# Patient Record
Sex: FEMALE | Race: WHITE | Employment: OTHER | URBAN - METROPOLITAN AREA
[De-identification: names, ages, dates, MRNs, and addresses within clinical notes are randomized per-mention and may not be internally consistent; named-entity substitution may affect disease eponyms.]

---

## 2019-12-16 ENCOUNTER — HOSPITAL ENCOUNTER (EMERGENCY)
Age: 84
Discharge: HOME | End: 2019-12-16
Attending: EMERGENCY MEDICINE
Payer: MEDICARE

## 2019-12-16 ENCOUNTER — APPOINTMENT (OUTPATIENT)
Dept: CT IMAGING | Age: 84
End: 2019-12-16
Attending: EMERGENCY MEDICINE
Payer: MEDICARE

## 2019-12-16 VITALS
SYSTOLIC BLOOD PRESSURE: 152 MMHG | TEMPERATURE: 97.8 F | DIASTOLIC BLOOD PRESSURE: 84 MMHG | RESPIRATION RATE: 18 BRPM | WEIGHT: 100.97 LBS | OXYGEN SATURATION: 99 % | HEART RATE: 90 BPM

## 2019-12-16 DIAGNOSIS — M54.9 BACK PAIN, UNSPECIFIED BACK LOCATION, UNSPECIFIED BACK PAIN LATERALITY, UNSPECIFIED CHRONICITY: ICD-10-CM

## 2019-12-16 DIAGNOSIS — S01.01XA LACERATION OF SCALP, INITIAL ENCOUNTER: ICD-10-CM

## 2019-12-16 DIAGNOSIS — S09.90XA CLOSED HEAD INJURY, INITIAL ENCOUNTER: Primary | ICD-10-CM

## 2019-12-16 DIAGNOSIS — R26.81 UNSTEADY GAIT: ICD-10-CM

## 2019-12-16 LAB
ALBUMIN SERPL-MCNC: 3.6 G/DL (ref 3.5–5)
ALBUMIN/GLOB SERPL: 1 {RATIO} (ref 1.1–2.2)
ALP SERPL-CCNC: 75 U/L (ref 45–117)
ALT SERPL-CCNC: 21 U/L (ref 12–78)
ANION GAP SERPL CALC-SCNC: 5 MMOL/L (ref 5–15)
APPEARANCE UR: CLEAR
AST SERPL-CCNC: 27 U/L (ref 15–37)
ATRIAL RATE: 84 BPM
BACTERIA URNS QL MICRO: NEGATIVE /HPF
BASOPHILS # BLD: 0 K/UL (ref 0–0.1)
BASOPHILS NFR BLD: 1 % (ref 0–1)
BILIRUB SERPL-MCNC: 0.4 MG/DL (ref 0.2–1)
BILIRUB UR QL: NEGATIVE
BUN SERPL-MCNC: 21 MG/DL (ref 6–20)
BUN/CREAT SERPL: 25 (ref 12–20)
CALCIUM SERPL-MCNC: 9.3 MG/DL (ref 8.5–10.1)
CALCULATED P AXIS, ECG09: 81 DEGREES
CALCULATED R AXIS, ECG10: -39 DEGREES
CALCULATED T AXIS, ECG11: 82 DEGREES
CHLORIDE SERPL-SCNC: 108 MMOL/L (ref 97–108)
CK SERPL-CCNC: 69 U/L (ref 26–192)
CO2 SERPL-SCNC: 27 MMOL/L (ref 21–32)
COLOR UR: NORMAL
CREAT SERPL-MCNC: 0.83 MG/DL (ref 0.55–1.02)
DIAGNOSIS, 93000: NORMAL
DIFFERENTIAL METHOD BLD: ABNORMAL
EOSINOPHIL # BLD: 0.3 K/UL (ref 0–0.4)
EOSINOPHIL NFR BLD: 4 % (ref 0–7)
EPITH CASTS URNS QL MICRO: NORMAL /LPF
ERYTHROCYTE [DISTWIDTH] IN BLOOD BY AUTOMATED COUNT: 11.9 % (ref 11.5–14.5)
GLOBULIN SER CALC-MCNC: 3.5 G/DL (ref 2–4)
GLUCOSE SERPL-MCNC: 125 MG/DL (ref 65–100)
GLUCOSE UR STRIP.AUTO-MCNC: NEGATIVE MG/DL
HCT VFR BLD AUTO: 36.4 % (ref 35–47)
HGB BLD-MCNC: 11.7 G/DL (ref 11.5–16)
HGB UR QL STRIP: NEGATIVE
HYALINE CASTS URNS QL MICRO: NORMAL /LPF (ref 0–5)
IMM GRANULOCYTES # BLD AUTO: 0 K/UL (ref 0–0.04)
IMM GRANULOCYTES NFR BLD AUTO: 0 % (ref 0–0.5)
KETONES UR QL STRIP.AUTO: NEGATIVE MG/DL
LEUKOCYTE ESTERASE UR QL STRIP.AUTO: NEGATIVE
LYMPHOCYTES # BLD: 2.8 K/UL (ref 0.8–3.5)
LYMPHOCYTES NFR BLD: 34 % (ref 12–49)
MCH RBC QN AUTO: 31.9 PG (ref 26–34)
MCHC RBC AUTO-ENTMCNC: 32.1 G/DL (ref 30–36.5)
MCV RBC AUTO: 99.2 FL (ref 80–99)
MONOCYTES # BLD: 0.4 K/UL (ref 0–1)
MONOCYTES NFR BLD: 5 % (ref 5–13)
NEUTS SEG # BLD: 4.8 K/UL (ref 1.8–8)
NEUTS SEG NFR BLD: 56 % (ref 32–75)
NITRITE UR QL STRIP.AUTO: NEGATIVE
NRBC # BLD: 0 K/UL (ref 0–0.01)
NRBC BLD-RTO: 0 PER 100 WBC
P-R INTERVAL, ECG05: 180 MS
PH UR STRIP: 6 [PH] (ref 5–8)
PLATELET # BLD AUTO: 166 K/UL (ref 150–400)
PMV BLD AUTO: 10.8 FL (ref 8.9–12.9)
POTASSIUM SERPL-SCNC: 3.5 MMOL/L (ref 3.5–5.1)
PROT SERPL-MCNC: 7.1 G/DL (ref 6.4–8.2)
PROT UR STRIP-MCNC: NEGATIVE MG/DL
Q-T INTERVAL, ECG07: 360 MS
QRS DURATION, ECG06: 84 MS
QTC CALCULATION (BEZET), ECG08: 425 MS
RBC # BLD AUTO: 3.67 M/UL (ref 3.8–5.2)
RBC #/AREA URNS HPF: NORMAL /HPF (ref 0–5)
SODIUM SERPL-SCNC: 140 MMOL/L (ref 136–145)
SP GR UR REFRACTOMETRY: 1.01 (ref 1–1.03)
TROPONIN I SERPL-MCNC: <0.05 NG/ML
UA: UC IF INDICATED,UAUC: NORMAL
UROBILINOGEN UR QL STRIP.AUTO: 0.2 EU/DL (ref 0.2–1)
VENTRICULAR RATE, ECG03: 84 BPM
WBC # BLD AUTO: 8.3 K/UL (ref 3.6–11)
WBC URNS QL MICRO: NORMAL /HPF (ref 0–4)

## 2019-12-16 PROCEDURE — 82550 ASSAY OF CK (CPK): CPT

## 2019-12-16 PROCEDURE — 75810000293 HC SIMP/SUPERF WND  RPR

## 2019-12-16 PROCEDURE — 97530 THERAPEUTIC ACTIVITIES: CPT

## 2019-12-16 PROCEDURE — 97116 GAIT TRAINING THERAPY: CPT

## 2019-12-16 PROCEDURE — 77030018836 HC SOL IRR NACL ICUM -A

## 2019-12-16 PROCEDURE — 77030008462 HC STPLR SKN PROX J&J -A

## 2019-12-16 PROCEDURE — 84484 ASSAY OF TROPONIN QUANT: CPT

## 2019-12-16 PROCEDURE — 36415 COLL VENOUS BLD VENIPUNCTURE: CPT

## 2019-12-16 PROCEDURE — 72131 CT LUMBAR SPINE W/O DYE: CPT

## 2019-12-16 PROCEDURE — 74011250636 HC RX REV CODE- 250/636: Performed by: EMERGENCY MEDICINE

## 2019-12-16 PROCEDURE — 97162 PT EVAL MOD COMPLEX 30 MIN: CPT

## 2019-12-16 PROCEDURE — 70450 CT HEAD/BRAIN W/O DYE: CPT

## 2019-12-16 PROCEDURE — 97161 PT EVAL LOW COMPLEX 20 MIN: CPT

## 2019-12-16 PROCEDURE — 81001 URINALYSIS AUTO W/SCOPE: CPT

## 2019-12-16 PROCEDURE — 93005 ELECTROCARDIOGRAM TRACING: CPT

## 2019-12-16 PROCEDURE — 74011250637 HC RX REV CODE- 250/637: Performed by: EMERGENCY MEDICINE

## 2019-12-16 PROCEDURE — 72128 CT CHEST SPINE W/O DYE: CPT

## 2019-12-16 PROCEDURE — 99285 EMERGENCY DEPT VISIT HI MDM: CPT

## 2019-12-16 PROCEDURE — 80053 COMPREHEN METABOLIC PANEL: CPT

## 2019-12-16 PROCEDURE — 72125 CT NECK SPINE W/O DYE: CPT

## 2019-12-16 PROCEDURE — 85025 COMPLETE CBC W/AUTO DIFF WBC: CPT

## 2019-12-16 RX ORDER — MECLIZINE HCL 12.5 MG 12.5 MG/1
25 TABLET ORAL
Status: COMPLETED | OUTPATIENT
Start: 2019-12-16 | End: 2019-12-16

## 2019-12-16 RX ORDER — BISMUTH SUBSALICYLATE 262 MG
1 TABLET,CHEWABLE ORAL DAILY
COMMUNITY

## 2019-12-16 RX ORDER — METHOCARBAMOL 500 MG/1
500 TABLET, FILM COATED ORAL
Status: COMPLETED | OUTPATIENT
Start: 2019-12-16 | End: 2019-12-16

## 2019-12-16 RX ORDER — ACETAMINOPHEN 500 MG
1000 TABLET ORAL
Status: COMPLETED | OUTPATIENT
Start: 2019-12-16 | End: 2019-12-16

## 2019-12-16 RX ORDER — MECLIZINE HYDROCHLORIDE 25 MG/1
25 TABLET ORAL
Qty: 20 TAB | Refills: 0 | Status: SHIPPED | OUTPATIENT
Start: 2019-12-16 | End: 2020-12-03

## 2019-12-16 RX ORDER — ACETAMINOPHEN 500 MG/1
1000 CAPSULE, LIQUID FILLED ORAL DAILY
COMMUNITY
End: 2020-12-03

## 2019-12-16 RX ORDER — METHOCARBAMOL 500 MG/1
500 TABLET, FILM COATED ORAL
Qty: 20 TAB | Refills: 0 | Status: SHIPPED | OUTPATIENT
Start: 2019-12-16 | End: 2020-11-27

## 2019-12-16 RX ORDER — ACETAMINOPHEN 500 MG/1
500 CAPSULE, LIQUID FILLED ORAL
COMMUNITY
End: 2020-12-03

## 2019-12-16 RX ORDER — MULTIVITAMIN
1 TABLET ORAL DAILY
COMMUNITY

## 2019-12-16 RX ORDER — KETOROLAC TROMETHAMINE 30 MG/ML
15 INJECTION, SOLUTION INTRAMUSCULAR; INTRAVENOUS
Status: COMPLETED | OUTPATIENT
Start: 2019-12-16 | End: 2019-12-16

## 2019-12-16 RX ADMIN — KETOROLAC TROMETHAMINE 15 MG: 30 INJECTION, SOLUTION INTRAMUSCULAR at 10:57

## 2019-12-16 RX ADMIN — MECLIZINE 25 MG: 12.5 TABLET ORAL at 13:00

## 2019-12-16 RX ADMIN — ACETAMINOPHEN 1000 MG: 500 TABLET ORAL at 09:43

## 2019-12-16 RX ADMIN — METHOCARBAMOL TABLETS 500 MG: 500 TABLET, COATED ORAL at 10:57

## 2019-12-16 NOTE — PROGRESS NOTES
Problem: Mobility Impaired (Adult and Pediatric)  Goal: *Acute Goals and Plan of Care (Insert Text)  Description  FUNCTIONAL STATUS PRIOR TO ADMISSION: Pt is very [place] and indicates son can answer hx questions. He states that pt uses a quad cane mostly on her own at baseline but states she has had multiple falls since Sept. He states she does her own bathing and dressing. He is not sure how she accomplishes bathing but believes she sits on the toilet with legs in tub and uses hand held. They both state she has had existing back pain issues for which she states \"I take my tylenol and caltrate in the morning and then I'm ok\". Son states she own a [facility] but does not use it. The son manages her meds but per him she takes very few other than vitamins. HOME SUPPORT PRIOR TO ADMISSION: The patient lived with son but required little assist.    Physical Therapy Goals  Initiated 12/16/2019  1. Patient will move from supine to sit and sit to supine , scoot up and down and roll side to side in bed with minimal assistance/contact guard assist within 7 day(s). 2.  Patient will transfer from bed to chair and chair to bed with minimal assistance/contact guard assist using the least restrictive device within 7 day(s). MET  3. Patient will perform sit to stand with minimal assistance/contact guard assist within 7 day(s). MET  4. Patient will ambulate with minimal assistance/contact guard assist for 150 feet with the least restrictive device within 7 day(s). MET  5. Patient will ascend/descend 3 stairs with handrail(s) with minimal assistance/contact guard assist within 7 day(s). 12/16/2019 1222 by Rosalind Morel, PT  Outcome: Resolved/Met, see above  12/16/2019 1022 by Robby Garcia, PT  Outcome: Progressing Towards Goal    PHYSICAL THERAPY TREATMENT  Patient: Mine Obando (51 y.o. female)  Date: 12/16/2019  Diagnosis: No admission diagnoses are documented for this encounter.    <principal problem not specified>       Precautions: fall   Chart, physical therapy assessment, plan of care and goals were reviewed. ASSESSMENT  Patient continues with skilled PT services and is progressing towards goals. Pt has been medicated further in the ED. Updated testing has come back negative for acute changes. Second ED PT session requested by Dr. Aden Villalpando to re-assess pt's progress with mobility. Pt now showing much improved function, meeting above goals for functional mobility and amb on level. Current Level of Function Impacting Discharge (mobility/balance): Pt is now able to complete all bed mobility, transfers and amb with RW with min to CGA of 1. She is no longer c/o dizziness and reports pain much more tolerable. Recommended continued use of RW and follow up with HHPT given fall hx and current issues related to last night's fall. Pt refusing both. Reviewed benefits and rationale of both the device and the therapy with the son and the pt. Pt continues to state she wants to just go home and see how she does and wants to use her quad cane. Son compliant with pt's wishes. Emphasized safety at home with use of BSC to avoid rushing for toileting. Emphasized need for son to physically be present and assist with mobility and ADLs and both voiced understanding. Other factors to consider for discharge: son lives with her but typically does not provide close support         PLAN :  Patient continues to benefit from skilled intervention to address the above impairments. Continue treatment per established plan of care. to address goals.     Recommendation for discharge: (in order for the patient to meet his/her long term goals)  Physical therapy at least 2 days/week in the home AND ensure assist and/or supervision for safety with mobility/gait; pt and son decline HHPT     This discharge recommendation:  Has been made in collaboration with the attending provider and/or case management    IF patient discharges home will need the following DME: rolling walker but pt declined       SUBJECTIVE:   Patient stated I want to go home and see how I do.     OBJECTIVE DATA SUMMARY:   Critical Behavior:  Neurologic State: Alert, Other (Comment)(in distress from pain and dizziness)  Orientation Level: Oriented to person, Oriented to place, Oriented to situation  Cognition: Follows commands     Functional Mobility Training:  Bed Mobility:  Rolling: Minimum assistance  Supine to Sit: Contact guard assistance  Sit to Supine: Minimum assistance  Scooting: Contact guard assistance        Transfers:  Sit to Stand: Minimum assistance;Contact guard assistance  Stand to Sit: Contact guard assistance                             Balance:  Sitting: Intact  Sitting - Static: Fair (occasional)  Sitting - Dynamic: Poor (constant support)(due to tolerance)  Standing: Impaired  Standing - Static: Good; Other (comment)(with RW)  Standing - Dynamic : Fair(with RW)  Ambulation/Gait Training:  Distance (ft): 20 Feet (ft)  Assistive Device: Gait belt;Walker, rolling  Ambulation - Level of Assistance: Minimal assistance;Contact guard assistance;Assist x1     Gait Description (WDL): (unable to take steps)  Gait Abnormalities: Antalgic(on RLE)        Base of Support: Shift to left  Stance: Right decreased  Speed/Honey: Pace decreased (<100 feet/min); Slow            Activity Tolerance:   Fair  Please refer to the flowsheet for vital signs taken during this treatment.     After treatment patient left in no apparent distress:   Supine in bed, Call bell within reach, Caregiver / family present, and stretcher rails up     COMMUNICATION/COLLABORATION:   The patients plan of care was discussed with: Registered Nurse and care manager     Claudis Klinefelter Doornik, PT   Time Calculation: 29 mins

## 2019-12-16 NOTE — ED NOTES
Physical therapy at bedside; pt now has c/o back pain. Told Dr. Shaggy Maguire who verbally ordered Tylenol.

## 2019-12-16 NOTE — ED TRIAGE NOTES
Assumed care of pt from EMS. Per EMS pt reports that she tripped and fell back into the tub. PT hit back of head and is bleeding. Bandage applied by EMS. Pt son denies her being on blood thinners. Pt repeating that she is cold. C collar applied on arrival. Pt has hx of seizures and hip shoulder and elbow fx. Pt on monitor x3. Son at bedside.

## 2019-12-16 NOTE — ED NOTES
Attempted to walk pt. Pt reported feeling dizzy when sitting. Pt unstable on her feet and had to hold on to the RN while getting up.

## 2019-12-16 NOTE — ED NOTES
Pt resting quietly with son at bedside. Pt A&Ox4 and maintaining her own airway. Call bell in reach. Will continue to monitor.

## 2019-12-16 NOTE — ED NOTES
Bedside and Verbal shift change report given to Yvette Curiel (oncoming nurse) by Greta Boo (offgoing nurse). Report included the following information SBAR, ED Summary, MAR and Recent Results.

## 2019-12-16 NOTE — DISCHARGE INSTRUCTIONS
Patient Education        Learning About a Closed Head Injury  What is a closed head injury? A closed head injury happens when your head gets hit hard. The strong force of the blow causes your brain to shake in your skull. This movement can cause the brain to bruise, swell, or tear. Sometimes nerves or blood vessels also get damaged. This can cause bleeding in or around the brain. A concussion is a type of closed head injury. What are the symptoms? If you have a mild concussion, you may have a mild headache or feel \"not quite right. \" These symptoms are common. They usually go away over a few days to 4 weeks. But sometimes after a concussion, you feel like you can't function as well as before the injury. And you have new symptoms. This is called postconcussive syndrome. You may:  · Find it harder to solve problems, think, concentrate, or remember. · Have headaches. · Have changes in your sleep patterns, such as not being able to sleep or sleeping all the time. · Have changes in your personality. · Not be interested in your usual activities. · Feel angry or anxious without a clear reason. · Lose your sense of taste or smell. · Be dizzy, lightheaded, or unsteady. It may be hard to stand or walk. How is a closed head injury treated? Any person who may have a concussion needs to see a doctor. Some people have to stay in the hospital to be watched. Others can go home safely. If you go home, follow your doctor's instructions. He or she will tell you if you need someone to watch you closely for the next 24 hours or longer. Rest is the best treatment. Get plenty of sleep at night. And try to rest during the day. · Avoid activities that are physically or mentally demanding. These include housework, exercise, and schoolwork. And don't play video games, send text messages, or use the computer. You may need to change your school or work schedule to be able to avoid these activities.   · Ask your doctor when it's okay to drive, ride a bike, or operate machinery. · Take an over-the-counter pain medicine, such as acetaminophen (Tylenol), ibuprofen (Advil, Motrin), or naproxen (Aleve). Be safe with medicines. Read and follow all instructions on the label. · Check with your doctor before you use any other medicines for pain. · Do not drink alcohol or use illegal drugs. They can slow recovery. They can also increase your risk of getting a second head injury. Follow-up care is a key part of your treatment and safety. Be sure to make and go to all appointments, and call your doctor if you are having problems. It's also a good idea to know your test results and keep a list of the medicines you take. Where can you learn more? Go to http://edward-christian.info/. Enter E235 in the search box to learn more about \"Learning About a Closed Head Injury. \"  Current as of: March 28, 2019  Content Version: 12.2  © 0431-3527 [employer]. Care instructions adapted under license by [employer] (which disclaims liability or warranty for this information). If you have questions about a medical condition or this instruction, always ask your healthcare professional. [place] [address] any warranty or liability for your use of this information. Patient Education        Cuts: Care Instructions  Your Care Instructions  A cut can happen anywhere on your body. Stitches, staples, skin adhesives, or pieces of tape called Steri-Strips are sometimes used to keep the edges of a cut together and help it heal. Steri-Strips can be used by themselves or with stitches or staples. Sometimes cuts are left open. If the cut went deep and through the skin, the doctor may have closed the cut in two layers. A deeper layer of stitches brings the deep part of the cut together. These stitches will dissolve and don't need to be removed.  The upper layer closure, which could be stitches, staples, Steri-Strips, or adhesive, is what you see on the cut. A cut is often covered by a bandage. The doctor has checked you carefully, but problems can develop later. If you notice any problems or new symptoms, get medical treatment right away. Follow-up care is a key part of your treatment and safety. Be sure to make and go to all appointments, and call your doctor if you are having problems. It's also a good idea to know your test results and keep a list of the medicines you take. How can you care for yourself at home? If a cut is open or closed  · Prop up the sore area on a pillow anytime you sit or lie down during the next 3 days. Try to keep it above the level of your heart. This will help reduce swelling. · Keep the cut dry for the first 24 to 48 hours. After this, you can shower if your doctor okays it. Pat the cut dry. · Don't soak the cut, such as in a bathtub. Your doctor will tell you when it's safe to get the cut wet. · After the first 24 to 48 hours, clean the cut with soap and water 2 times a day unless your doctor gives you different instructions. ? Don't use hydrogen peroxide or alcohol, which can slow healing. ? You may cover the cut with a thin layer of petroleum jelly and a nonstick bandage. ? If the doctor put a bandage over the cut, put on a new bandage after cleaning the cut or if the bandage gets wet or dirty. · Avoid any activity that could cause your cut to reopen. · Be safe with medicines. Read and follow all instructions on the label. ? If the doctor gave you a prescription medicine for pain, take it as prescribed. ? If you are not taking a prescription pain medicine, ask your doctor if you can take an over-the-counter medicine. If the cut is closed with stitches, staples, or Steri-Strips  · Follow the above instructions for open or closed cuts. · Do not remove the stitches or staples on your own.  Your doctor will tell you when to come back to have the stitches or staples removed. · Leave Steri-Strips on until they fall off. If the cut is closed with a skin adhesive  · Follow the above instructions for open or closed cuts. · Leave the skin adhesive on your skin until it falls off on its own. This may take 5 to 10 days. · Do not scratch, rub, or pick at the adhesive. · Do not put the sticky part of a bandage directly on the adhesive. · Do not put any kind of ointment, cream, or lotion over the area. This can make the adhesive fall off too soon. Do not use hydrogen peroxide or alcohol, which can slow healing. When should you call for help? Call your doctor now or seek immediate medical care if:    · You have new pain, or your pain gets worse.     · The skin near the cut is cold or pale or changes color.     · You have tingling, weakness, or numbness near the cut.     · The cut starts to bleed, and blood soaks through the bandage. Oozing small amounts of blood is normal.     · You have trouble moving the area near the cut.     · You have symptoms of infection, such as:  ? Increased pain, swelling, warmth, or redness around the cut.  ? Red streaks leading from the cut.  ? Pus draining from the cut.  ? A fever.    Watch closely for changes in your health, and be sure to contact your doctor if:    · The cut reopens.     · You do not get better as expected. Where can you learn more? Go to http://edward-christian.info/. Enter M735 in the search box to learn more about \"Cuts: Care Instructions. \"  Current as of: June 26, 2019  Content Version: 12.2  © 6213-9730 [employer]. Care instructions adapted under license by [employer] (which disclaims liability or warranty for this information). If you have questions about a medical condition or this instruction, always ask your healthcare professional. [address] any warranty or liability for your use of this information.        Patient Education        Preventing Falls: Care Instructions  Your Care Instructions    Getting around your home safely can be a challenge if you have injuries or health problems that make it easy for you to fall. Loose rugs and furniture in walkways are among the dangers for many older people who have problems walking or who have poor eyesight. People who have conditions such as arthritis, osteoporosis, or dementia also have to be careful not to fall. You can make your home safer with a few simple measures. Follow-up care is a key part of your treatment and safety. Be sure to make and go to all appointments, and call your doctor if you are having problems. It's also a good idea to know your test results and keep a list of the medicines you take. How can you care for yourself at home? Taking care of yourself  · You may get dizzy if you do not drink enough water. To prevent dehydration, drink plenty of fluids, enough so that your urine is light yellow or clear like water. Choose water and other caffeine-free clear liquids. If you have kidney, heart, or liver disease and have to limit fluids, talk with your doctor before you increase the amount of fluids you drink. · Exercise regularly to improve your strength, muscle tone, and balance. Walk if you can. Swimming may be a good choice if you cannot walk easily. · Have your vision and hearing checked each year or any time you notice a change. If you have trouble seeing and hearing, you might not be able to avoid objects and could lose your balance. · Know the side effects of the medicines you take. Ask your doctor or pharmacist whether the medicines you take can affect your balance. Sleeping pills or sedatives can affect your balance. · Limit the amount of alcohol you drink. Alcohol can impair your balance and other senses. · Ask your doctor whether calluses or corns on your feet need to be removed. If you wear loose-fitting shoes because of calluses or corns, you can lose your balance and fall.   · Talk to your doctor if you have numbness in your feet. Preventing falls at home  · Remove raised doorway thresholds, throw rugs, and clutter. Repair loose carpet or raised areas in the floor. · Move furniture and electrical cords to keep them out of walking paths. · Use nonskid floor wax, and wipe up spills right away, especially on ceramic tile floors. · If you use a walker or cane, put rubber tips on it. If you use crutches, clean the bottoms of them regularly with an abrasive pad, such as steel wool. · Keep your house well lit, especially [place], and outside walkways. Use night-lights in areas such as hallways and bathrooms. Add extra light switches or use remote switches (such as switches that go on or off when you clap your hands) to make it easier to turn lights on if you have to get up during the night. · Install sturdy handrails on stairways. · Move items in your cabinets so that the things you use a lot are on the lower shelves (about waist level). · Keep a cordless phone and a flashlight with new batteries by your bed. If possible, put a phone in each of the main rooms of your house, or carry a cell phone in case you fall and cannot reach a phone. Or, you can wear a device around your neck or wrist. You push a button that sends a signal for help. · Wear low-heeled shoes that fit well and give your feet good support. Use footwear with nonskid soles. Check the heels and soles of your shoes for wear. Repair or replace worn heels or soles. · Do not wear socks without shoes on wood floors. · Walk on the grass when the sidewalks are slippery. If you live in an area that gets snow and ice in the winter, sprinkle salt on slippery steps and sidewalks. Preventing falls in the bath  · Install grab bars and nonskid mats inside and outside your shower or tub and near the toilet and sinks. · Use shower chairs and bath benches.   · Use a hand-held shower head that will allow you to sit while showering. · Get into a tub or shower by putting the weaker leg in first. Get out of a tub or shower with your strong side first.  · Repair loose toilet seats and consider installing a raised toilet seat to make getting on and off the toilet easier. · Keep your bathroom door unlocked while you are in the shower. Where can you learn more? Go to http://edward-christian.info/. Enter 0476 [id number] 69 [id number] in the search box to learn more about \"Preventing Falls: Care Instructions. \"  Current as of: November 7, 2018  Content Version: 12.2  © 7129-6466 [employer]. Care instructions adapted under license by [employer] (which disclaims liability or warranty for this information). If you have questions about a medical condition or this instruction, always ask your healthcare professional. [address] any warranty or liability for your use of this information.

## 2019-12-16 NOTE — PROGRESS NOTES
Reason for Admission:   Fall at home (patient not being admitted)                   RRAT Score:          5 low risk           Plan for utilizing home health:      Has not used home health in the past                    Current Advanced Directive/Advance Care Plan:   none                         Transition of Care Plan:                      1.  Patient brought to ER after fall at home, patient not currently being admitted  2. Patient to discharge home with walker and home health services. Patient is a 80year old female brought to the ER after a fall at home. Patient alert and oriented, very hard of hearing, patient's son present in room (also very hard of hearing). Demographic information verified and correct. Insurance information verified and correct. Patient 's son lives with her , no home oxygen, uses a cane for ambulation and has not used home health in the past.  Patient uses [facility] pharmacy in [facility]. Patient states she is independent with bathing and dressing, her son prepares meals and provides some supervision with medications. Patient does not drive and her son does not have a car. Patient's daughter provides transportation    Home health agency list provided to patient's son for choice for home PT.        1215: PT evaluated patient again, patient and son refuse HH at this time. Patient refuses recommended walker. Call placed to patient's daughter Lia Romero [phone] to assess family's plans. Mrs. La Maguire states she provides transportation for patient/son but does not know how her mother is doing at home. When asked about long term plans for patient, Mrs. La Maguire states \"I haven't been told about anything like that\". Mrs. La Maguire states she understood patient fell at night, she does not feel more assistance is needed at home. When informed that patient is refusing HH and a walker, Mrs. La Maguire stated \"Well, if that's what she wants its OK with me\".     Patient discharging home, refusing HH and DME at this time. Patient ready for discharge from CM standpoint. Care Management Interventions  PCP Verified by CM: Yes(patient states she sees Dr. Cara Stein at [facility])  Mode of Transport at Discharge:  Other (see comment)  Transition of Care Consult (CM Consult): Discharge Planning  Discharge Durable Medical Equipment: No  Physical Therapy Consult: Yes  Occupational Therapy Consult: No  Speech Therapy Consult: No  Current Support Network: Own Home(pt's son lives with her, 1 story house, 4 steps to enter)  Confirm Follow Up Transport: Family  Plan discussed with Pt/Family/Caregiver: Yes  Discharge Location  Discharge Placement: [address] Jonathan Doyle, RN, [facility]  [phone]

## 2019-12-16 NOTE — PROGRESS NOTES
Pharmacy Clarification of Prior to Admission Medication Regimen     The patient was interviewed regarding clarification of the prior to admission medication regimen. Son, Dionne José, was present in room and obtained permission from patient to discuss drug regimen with visitor(s) present. Patient was questioned regarding use of any other inhalers, topical products, over the counter medications, herbal medications, vitamin products or ophthalmic/nasal/otic medication use. Information Obtained From: Patient, patient's son, prescription bottle, RX Query    Pertinent Pharmacy Findings:  Updated patients preferred outpatient pharmacy to: Patient takes 1000 mg QAM, 500 mg TIDCC, and 500 mg QHS  Patient's son, Dionne José, helps care for the pateint    PTA medication list was corrected to the following:     Prior to Admission Medications   Prescriptions Last Dose Informant Taking?   acetaminophen (TYLENOL) 500 mg capsule 12/15/2019 at Unknown time Self Yes   Sig: Take 500 mg by mouth Before breakfast, lunch, dinner and at bedtime. Patient takes 1000 mg QAM, 500 mg TIDCC, and 500 mg QHS   acetaminophen (TYLENOL) 500 mg capsule 12/15/2019 at Unknown time Self Yes   Sig: Take 1,000 mg by mouth daily. Patient takes 1000 mg QAM, 500 mg TIDCC, and 500 mg QHS   amLODIPine-benazepril (LOTREL) 5-10 mg per capsule 12/15/2019 at Unknown time Other Yes   Sig: Take 1 Cap by mouth daily. aspirin delayed-release 81 mg tablet 12/15/2019 at Unknown time Self Yes   Sig: Take 81 mg by mouth daily. calcium-cholecalciferol, D3, (CALTRATE 600+D) tablet 12/15/2019 at Unknown time Self Yes   Sig: Take 1 Tab by mouth daily. multivitamin (ONE A DAY) tablet 12/15/2019 at Unknown time Self Yes   Sig: Take 1 Tab by mouth daily. omega 3-dha-epa-fish oil (FISH OIL) 100-160-1,000 mg cap 12/15/2019 at Unknown time Self Yes   Sig: Take 1 Cap by mouth daily.    vitamin H-V-S-lutein-minerals (OCUVITE) tablet 12/15/2019 at Unknown time Self Yes   Sig: Take 1 Tab by mouth daily.       Facility-Administered Medications: None          Thank you,  Demetrius Del Castillo CPhT  Medication History Pharmacy Technician

## 2019-12-16 NOTE — ED PROVIDER NOTES
EMERGENCY DEPARTMENT HISTORY AND PHYSICAL EXAM      Date: 12/16/2019  Patient Name: Bairon Espitia    History of Presenting Illness     Chief Complaint   Patient presents with    Fall       History Provided By: Patient and Patient's Son    HPI: Bairon Espitia, 80 y.o. female with PMHx significant for hypertension, seizures and hearing loss presents to the emergency room with chief complaint of head injury and fall. Patient was using the restroom when she fell backwards into the tub striking the back of her head. She presents with head pain and a laceration to the back of her head. She does not think that she lost consciousness but is somewhat unsure. Patient's son lives with her and heard the thump when patient fell and then immediately the patient began crying out for him. When he found her, he noted the blood in the tub from her head wound and went to call EMS. He did not see her have any kind of seizure activity and she was conscious when he reached her. Patient complains of \"pain all over\". She denies any dizziness, nausea, vomiting, chest pain, shortness of breath, dysuria, hematuria. She takes a baby aspirin daily but is not on any other blood thinners. PCP: No primary care provider on file. No current facility-administered medications on file prior to encounter. Current Outpatient Medications on File Prior to Encounter   Medication Sig Dispense Refill    aspirin delayed-release 81 mg tablet Take 81 mg by mouth daily.  acetaminophen (TYLENOL EXTRA STRENGTH) 500 mg tablet Take 500 mg by mouth every six (6) hours as needed for Pain.  amLODIPine-benazepril (LOTREL) 5-10 mg per capsule Take 1 Cap by mouth daily.          Past History     Past Medical History:  Past Medical History:   Diagnosis Date    Hypertension     Seizure [facility])        Past Surgical History:  Past Surgical History:   Procedure Laterality Date    HX ORTHOPAEDIC      hip, shoulder, elbow       Family History:  History reviewed. No pertinent family history. Social History:  Social History     Tobacco Use    Smoking status: Never Smoker    Smokeless tobacco: Never Used   Substance Use Topics    Alcohol use: Not Currently    Drug use: Never       Allergies: Allergies   Allergen Reactions    Penicillins Anaphylaxis         Review of Systems   Review of Systems   Constitutional: Negative for chills and fever. HENT: Negative for congestion, ear pain, rhinorrhea and sore throat. Eyes: Negative. Respiratory: Negative for cough, chest tightness, shortness of breath and wheezing. Cardiovascular: Negative for chest pain, palpitations and leg swelling. Gastrointestinal: Negative for abdominal pain, constipation, diarrhea, nausea and vomiting. Genitourinary: Negative for dysuria, flank pain and hematuria. Musculoskeletal: Negative for back pain, myalgias and neck stiffness. Skin: Positive for wound. Negative for rash. Neurological: Positive for headaches. Negative for syncope, weakness, light-headedness and numbness. Psychiatric/Behavioral: Negative for confusion. The patient is nervous/anxious.           Physical Exam    General appearance - well nourished, well appearing, and in no distress  Eyes - pupils equal and reactive, extraocular eye movements intact  ENT - mucous membranes moist, pharynx normal without lesions  Head-dried blood from laceration posterior left scalp  Neck-tender to palpation posterior cervical spine   chest - clear to auscultation, no wheezes, rales or rhonchi; non-tender to palpation  Heart - normal rate and regular rhythm, S1 and S2 normal, no murmurs noted  Abdomen - soft, nontender, nondistended, no masses or organomegaly  Musculoskeletal - no joint tenderness, deformity or swelling; normal ROM  Extremities - peripheral pulses normal, no pedal edema  Skin - normal coloration and turgor, no rashes  Neurological - alert, oriented x3, normal speech, no focal findings or movement disorder noted    Diagnostic Study Results     Labs -     Recent Results (from the past 12 hour(s))   CBC WITH AUTOMATED DIFF    Collection Time: 12/16/19  3:33 AM   Result Value Ref Range    WBC 8.3 3.6 - 11.0 K/uL    RBC 3.67 (L) 3.80 - 5.20 M/uL    HGB 11.7 11.5 - 16.0 g/dL    HCT 36.4 35.0 - 47.0 %    MCV 99.2 (H) 80.0 - 99.0 FL    MCH 31.9 26.0 - 34.0 PG    MCHC 32.1 30.0 - 36.5 g/dL    RDW 11.9 11.5 - 14.5 %    PLATELET 969 359 - 907 K/uL    MPV 10.8 8.9 - 12.9 FL    NRBC 0.0 0  WBC    ABSOLUTE NRBC 0.00 0.00 - 0.01 K/uL    NEUTROPHILS 56 32 - 75 %    LYMPHOCYTES 34 12 - 49 %    MONOCYTES 5 5 - 13 %    EOSINOPHILS 4 0 - 7 %    BASOPHILS 1 0 - 1 %    IMMATURE GRANULOCYTES 0 0.0 - 0.5 %    ABS. NEUTROPHILS 4.8 1.8 - 8.0 K/UL    ABS. LYMPHOCYTES 2.8 0.8 - 3.5 K/UL    ABS. MONOCYTES 0.4 0.0 - 1.0 K/UL    ABS. EOSINOPHILS 0.3 0.0 - 0.4 K/UL    ABS. BASOPHILS 0.0 0.0 - 0.1 K/UL    ABS. IMM. GRANS. 0.0 0.00 - 0.04 K/UL    DF AUTOMATED         Radiologic Studies -   CT HEAD WO CONT    (Results Pending)   CT SPINE CERV WO CONT    (Results Pending)     CT Results  (Last 48 hours)    None        CXR Results  (Last 48 hours)    None            Medical Decision Making   I am the first provider for this patient. I reviewed the vital signs, available nursing notes, past medical history, past surgical history, family history and social history. Vital Signs-Reviewed the patient's vital signs.   Patient Vitals for the past 12 hrs:   Temp Pulse Resp BP SpO2   12/16/19 0319     99 %   12/16/19 0307 98.3 °F (36.8 °C) 98 18 166/59 98 %       EKG: Normal sinus rhythm, 84 bpm, left axis deviation, normal SD, QRS, QTc intervals, no ischemic changes    Records Reviewed: Nursing Notes and Old Medical Records    Provider Notes (Medical Decision Making):   Differential diagnosis: Laceration, intracranial bleed, fracture, contusion, UTI, electrolyte abnormality, arrhythmia    ED Course:   Initial assessment performed. The patients presenting problems have been discussed, and they are in agreement with the care plan formulated and outlined with them. I have encouraged them to ask questions as they arise throughout their visit. Progress Notes:  ED Course as of Dec 23 0002   Mon Dec 16, 2019   0702 Procedure note: Laceration repair. 2 cm posterior left occipital  scalp laceration irrigated. Anesthetized with 4 mL's of 1% lidocaine with epi. Skin closed with 4 skin staples patient tolerated procedure well    [AO]   3968 Patient and son are now stating that patient has been falling recently and this fall was the worst that she has had so far. They are concerned about her going home when she keeps falling. Attempted to ambulate patient and she was able to stand and shuffle next to the bed while holding onto the bed, but was not able to walk independently. She does not have a walker at home    [AO]      ED Course User Index  [AO] Cleo Stevenson MD       Disposition:  Discharge home    PLAN:  1. Discharge Medication List as of 12/16/2019 12:23 PM      START taking these medications    Details   Walker misc 1 Units by Does Not Apply route as needed (unsteady gait). , Print, Disp-1 Each, R-1      methocarbamol (ROBAXIN) 500 mg tablet Take 1 Tab by mouth four (4) times daily as needed for Pain., Print, Disp-20 Tab, R-0         CONTINUE these medications which have NOT CHANGED    Details   !! acetaminophen (TYLENOL) 500 mg capsule Take 500 mg by mouth Before breakfast, lunch, dinner and at bedtime. Patient takes 1000 mg QAM, 500 mg TIDCC, and 500 mg QHS, Historical Med      !! acetaminophen (TYLENOL) 500 mg capsule Take 1,000 mg by mouth daily. Patient takes 1000 mg QAM, 500 mg TIDCC, and 500 mg QHS, Historical Med      omega 3-dha-epa-fish oil (FISH OIL) 100-160-1,000 mg cap Take 1 Cap by mouth daily. , Historical Med      calcium-cholecalciferol, D3, (CALTRATE 600+D) tablet Take 1 Tab by mouth daily. , Historical Med vitamin U-H-U-lutein-minerals (OCUVITE) tablet Take 1 Tab by mouth daily. , Historical Med      multivitamin (ONE A DAY) tablet Take 1 Tab by mouth daily. , Historical Med      aspirin delayed-release 81 mg tablet Take 81 mg by mouth daily. , Historical Med      amLODIPine-benazepril (LOTREL) 5-10 mg per capsule Take 1 Cap by mouth daily. , Historical Med       !! - Potential duplicate medications found. Please discuss with provider. STOP taking these medications       acetaminophen (TYLENOL EXTRA STRENGTH) 500 mg tablet Comments:   Reason for Stoppin.   Follow-up Information     Follow up With Specialties Details Why Contact Info    [facility] EMERGENCY DEPT Emergency Medicine  If symptoms worsen [address]  [address] [facility]  [phone]      Call Your primary care doctor     [facility] EMERGENCY DEPT Emergency Medicine  If symptoms worsen [address]  [address] [facility]  [phone]      Patient instructed to return to the emergency room or see  PCP in 7 to 10 days for staple remover. Return to ED if worse     Diagnosis     Clinical Impression:   1. Closed head injury, initial encounter    2. Laceration of scalp, initial encounter    3. Unsteady gait    4.  Back pain, unspecified back location, unspecified back pain laterality, unspecified chronicity

## 2019-12-16 NOTE — PROGRESS NOTES
Problem: Mobility Impaired (Adult and Pediatric)  Goal: *Acute Goals and Plan of Care (Insert Text)  Description  FUNCTIONAL STATUS PRIOR TO ADMISSION: Pt is very [place] and indicates son can answer hx questions. He states that pt uses a quad cane mostly on her own at baseline but states she has had multiple falls since Sept. He states she does her own bathing and dressing. He is not sure how she accomplishes bathing but believes she sits on the toilet with legs in tub and uses hand held. They both state she has had existing back pain issues for which she states \"I take my tylenol and caltrate in the morning and then I'm ok\". Son states she own a [facility] but does not use it. The son manages her meds but per him she takes very few other than vitamins. HOME SUPPORT PRIOR TO ADMISSION: The patient lived with son but required little assist.    Physical Therapy Goals  Initiated 12/16/2019  1. Patient will move from supine to sit and sit to supine , scoot up and down and roll side to side in bed with minimal assistance/contact guard assist within 7 day(s). 2.  Patient will transfer from bed to chair and chair to bed with minimal assistance/contact guard assist using the least restrictive device within 7 day(s). 3.  Patient will perform sit to stand with minimal assistance/contact guard assist within 7 day(s). 4.  Patient will ambulate with minimal assistance/contact guard assist for 150 feet with the least restrictive device within 7 day(s). 5.  Patient will ascend/descend 3 stairs with handrail(s) with minimal assistance/contact guard assist within 7 day(s). Outcome: Progressing Towards Goal   PHYSICAL THERAPY EMERGENCY DEPARTMENT EVALUATION WITH RECOMMENDED ADMISSION  Patient: Bairon Espitia (55 y.o. female)  Date: 12/16/2019  Primary Diagnosis: No admission diagnoses are documented for this encounter.         Precautions: fall       ASSESSMENT  Based on the objective data described below, the patient presents with markedly decreased functional mobility, gait, balance and activity tolerance with mid to low back and B leg pain R>L with c/o spinning dizziness upon sitting which remains constant throughout all s/p fall into bathtub at home. Current Level of Function Impacting Discharge (mobility/balance): Pt currently at mod to total A for bed mobility especially needing A of 2 to return to bed safely after stand with total lift of pt back into bed with PT/RN. Pt screaming in pain with activity and c/o severe dizziness. Unable to fully assess the dizziness due to tolerance. Vitals taken in supine and sitting: supine 148/71, HR 93, RR 22, sats 99%; sitting 158/67, HR 98, RR 19, sats 99%. Unable to tolerate standing well enough to obtain standing vitals. Pt very fearful in sitting and especially standing complaining of constant spinning dizziness and feeling off balance and like she is going to fall. Pt requiring mod to max A to maintain standing with RW. Pt unable to functionally take steps. Pt needed total lift of 2 as noted to return to bed as she was unable to step to adjust position at EOB to aid in activity. Pain level and location reported to MD who is following up with further testing. Functional Outcome Measure: The patient scored 15/100 on the barthel outcome measure which is indicative of 85% functional impairment. Other factors to consider for discharge: son lives with her but is of somewhat limited assist and although attempts to help appears apprehensive of manner in which to assist pt     Admission for this patient is recommended with continued acute therapy. PLAN :  Recommendations and Planned Interventions: bed mobility training, transfer training, gait training, therapeutic exercises, patient and family training/education, and therapeutic activities      Frequency/Duration: Patient will be followed by physical therapy:  5 times a week to address goals.     Recommendation for discharge: (in order for the patient to meet his/her long term goals)  Therapy up to 5 days/week in SNF setting - at this time due to amount of assist needed; all dependent upon progress    This discharge recommendation:  Has been made in collaboration with the attending provider and/or case management    Equipment recommendations for successful discharge (if) home: TBD     SUBJECTIVE:   Patient stated It hurts so much. I feel like I am going to fall.     OBJECTIVE DATA SUMMARY:   HISTORY:    Past Medical History:   Diagnosis Date    Hypertension     Seizure [facility])      Past Surgical History:   Procedure Laterality Date    HX ORTHOPAEDIC      hip, shoulder, elbow       Home Situation  Home Environment: Private residence  # Steps to Enter: (1+3+1+!)  Rails to Enter: Yes(on the set of 3)  Hand Rails : Bilateral(only on the 3)  One/Two Story Residence: One story  Living Alone: No  Support Systems: Child(rosario)  Current DME Used/Available at Home: BackOps, quad, Commode, bedside    EXAMINATION/PRESENTATION/DECISION MAKING:   Critical Behavior:  Neurologic State: Alert, Other (Comment)(in distress from pain and dizziness)  Orientation Level: Oriented to person, Oriented to place, Oriented to situation  Cognition: Follows commands     Hearing:very [place]       Range Of Motion:  AROM: Generally decreased, functional                       Strength:    Strength: Generally decreased, functional                    Tone & Sensation:                  Sensation: (unable to test due to pt's distress)               Functional Mobility:  Bed Mobility:  Rolling: Moderate assistance  Supine to Sit: Maximum assistance  Sit to Supine: Total assistance;Assist x2  Scooting:  Total assistance  Transfers:  Sit to Stand: Maximum assistance  Stand to Sit: Maximum assistance;Assist x2                       Balance:   Sitting: Impaired  Sitting - Static: Fair (occasional)  Sitting - Dynamic: Poor (constant support)(due to tolerance)  Standing: Impaired  Standing - Static: Poor(very fearful; weight shifted backward)  Standing - Dynamic : Not tested  Ambulation/Gait Training:              Gait Description (WDL): (unable to take steps)                      Special Tests:  Barthel Index:    Bathin  Bladder: 0  Bowels: 10  Groomin  Dressin  Feedin  Mobility: 0  Stairs: 0  Toilet Use: 0  Transfer (Bed to Chair and Back): 0  Total: 15/100       The Barthel ADL Index: Guidelines  1. The index should be used as a record of what a patient does, not as a record of what a patient could do. 2. The main aim is to establish degree of independence from any help, physical or verbal, however minor and for whatever reason. 3. The need for supervision renders the patient not independent. 4. A patient's performance should be established using the best available evidence. Asking the patient, friends/relatives and nurses are the usual sources, but direct observation and common sense are also important. However direct testing is not needed. 5. Usually the patient's performance over the preceding 24-48 hours is important, but occasionally longer periods will be relevant. 6. Middle categories imply that the patient supplies over 50 per cent of the effort. 7. Use of aids to be independent is allowed. Wenceslao Mohr., Barthel, D.W. (5671). Functional evaluation: the Barthel Index. [address] [employer] (14)2. JULIANA Edmonds, Jennifer Merchant, Abbey Pelaez, [place], [address] [place] [address] (). Measuring the change indisability after inpatient rehabilitation; comparison of the responsiveness of the Barthel Index and Functional [place] Measure. Journal of Neurology, Neurosurgery, and Psychiatry, 66(4), 587-835. Lendon Soulier, N.CONOR.A, ODILIA Sherwood, & Cornelius Nolan M.A. (2004.) Assessment of post-stroke quality of life in cost-effectiveness studies: The usefulness of the Barthel Index and the EuroQoL-5D.  Quality of Life Research, 13, 061-88           Physical Therapy Evaluation Charge Determination   History Examination Presentation Decision-Making   HIGH Complexity :3+ comorbidities / personal factors will impact the outcome/ POC  MEDIUM Complexity : 3 Standardized tests and measures addressing body structure, function, activity limitation and / or participation in recreation  MEDIUM Complexity : Evolving with changing characteristics  MEDIUM Complexity : FOTO score of 26-74      Based on the above components, the patient evaluation is determined to be of the following complexity level: MEDIUM     Pain:  Pain Scale 1: Numeric (0 - 10)  Pain Intensity 1: 9  Pain Location 1: Head;Neck, mid to low back, R>L leg  Aided into position of comfort; reported pain to RN and MD, pt being medicated and MD performing other testing    Activity Tolerance:   Poor  Please refer to the flowsheet for vital signs taken during this treatment. After treatment patient left in no apparent distress:   Supine in bed, Call bell within reach, Caregiver / family present, and ED stretcher rails up     COMMUNICATION/EDUCATION:   Communication/Collaboration:  Patient is unable to participate in goal setting and plan of care.     Findings and recommendations were discussed with: MD/DO physician and Registered Nurse and care manager     Thank you for this referral.  Dina Thompson, PT   Time Calculation: 32 mins

## 2020-01-01 ENCOUNTER — HOSPITAL ENCOUNTER (EMERGENCY)
Age: 85
Discharge: HOME | End: 2020-01-01
Attending: EMERGENCY MEDICINE
Payer: MEDICARE

## 2020-01-01 VITALS
SYSTOLIC BLOOD PRESSURE: 165 MMHG | HEIGHT: 60 IN | WEIGHT: 101 LBS | RESPIRATION RATE: 16 BRPM | BODY MASS INDEX: 19.83 KG/M2 | HEART RATE: 98 BPM | DIASTOLIC BLOOD PRESSURE: 76 MMHG | OXYGEN SATURATION: 99 % | TEMPERATURE: 97.8 F

## 2020-01-01 DIAGNOSIS — Z48.02 ENCOUNTER FOR STAPLE REMOVAL: Primary | ICD-10-CM

## 2020-01-01 PROCEDURE — 75810000275 HC EMERGENCY DEPT VISIT NO LEVEL OF CARE

## 2020-01-01 NOTE — DISCHARGE INSTRUCTIONS
Patient Education        Learning About Stitches and Staples Removal  When are stitches and staples removed? Your doctor will tell you when to have your stitches or staples removed, usually in 7 to 14 days. How long you'll be told to wait will depend on things like where the wound is located, how big and how deep the wound is, and what your general health is like. Do not remove the stitches on your own. Stitches on the face are usually removed within a week. But stitches and staples on other areas of the body, such as on the back or belly or over a joint, may need to stay in place longer, often a week or two. Be sure to follow your doctor's instructions. How are stitches and staples removed? It usually doesn't hurt when the doctor removes the stitches or staples. You may feel a tug as each stitch or staple is removed. · You will either be seated or lying down. · To remove stitches, the doctor will use scissors to cut each of the knots and then pull the threads out. · To remove staples, the doctor will use a tool to take out the staples one at a time. · The area may still feel tender after the stitches or staples are gone. But it should feel better within a few minutes or up to a few hours. What can you expect after stitches and staples are removed? Depending on the type and location of the cut, you will have a scar. Scars usually fade over time. Keep the area clean, but you won't need a bandage. When should you call for help? Call your doctor now or seek immediate medical care if :  · You have new pain, or your pain gets worse. · You have trouble moving the area near the scar. · You have symptoms of infection, such as:  ? Increased pain, swelling, warmth, or redness around the scar. ? Red streaks leading from the scar. ? Pus draining from the scar. ? A fever. Watch closely for changes in your health, and be sure to contact your doctor if:  · The scar opens.   · You do not get better as expected. Follow-up care is a key part of your treatment and safety. Be sure to make and go to all appointments, and call your doctor if you do not get better as expected. It's also a good idea to keep a list of the medicines you take. Where can you learn more? Go to http://edward-christian.info/. Enter P660 in the search box to learn more about \"Learning About Stitches and Staples Removal.\"  Current as of: June 26, 2019  Content Version: 12.2  © 1903-3847 [employer]. Care instructions adapted under license by [employer] (which disclaims liability or warranty for this information). If you have questions about a medical condition or this instruction, always ask your healthcare professional. [address] any warranty or liability for your use of this information.

## 2020-01-01 NOTE — ED NOTES
Discharge instructions reviewed with son by provider. son verbalized understanding of discharge instructions. Copy of discharge paperwork given. son signed the discharge paperwork to acknowledge that the paperwork was received. Signed sheet placed in scan box. Patient condition stable, respiratory status within normal limits, neuro status intact.  Discharged via wheelchair out of er, accompanied by son

## 2020-01-01 NOTE — ED PROVIDER NOTES
EMERGENCY DEPARTMENT HISTORY AND PHYSICAL EXAM      Date: 1/1/2020  Patient Name: Annie Gonzales    History of Presenting Illness     Chief Complaint   Patient presents with    Suture Removal     Staples at posterior head area placed 12/15. No bleeding noted       History Provided By: Patient    HPI: Annie Gonzales, 80 y.o. female presents by POV to the ED with cc of staple removal.  Patient's caregiver states that she was seen on December 15 by Dr. Gretta Spatz where she received 4 staples in the back of her head. Patient denies drainage, bleeding or any other complications from the staple site. Patient is here for staple removal.    There are no other complaints, changes, or physical findings at this time. PCP: Other, MD Lyn    No current facility-administered medications on file prior to encounter. Current Outpatient Medications on File Prior to Encounter   Medication Sig Dispense Refill    Walker misc 1 Units by Does Not Apply route as needed (unsteady gait). 1 Each 1    acetaminophen (TYLENOL) 500 mg capsule Take 500 mg by mouth Before breakfast, lunch, dinner and at bedtime. Patient takes 1000 mg QAM, 500 mg TIDCC, and 500 mg QHS      acetaminophen (TYLENOL) 500 mg capsule Take 1,000 mg by mouth daily. Patient takes 1000 mg QAM, 500 mg TIDCC, and 500 mg QHS      omega 3-dha-epa-fish oil (FISH OIL) 100-160-1,000 mg cap Take 1 Cap by mouth daily.  calcium-cholecalciferol, D3, (CALTRATE 600+D) tablet Take 1 Tab by mouth daily.  vitamin O-T-O-lutein-minerals (OCUVITE) tablet Take 1 Tab by mouth daily.  multivitamin (ONE A DAY) tablet Take 1 Tab by mouth daily.  methocarbamol (ROBAXIN) 500 mg tablet Take 1 Tab by mouth four (4) times daily as needed for Pain. 20 Tab 0    meclizine (ANTIVERT) 25 mg tablet Take 1 Tab by mouth three (3) times daily as needed for Dizziness. 20 Tab 0    aspirin delayed-release 81 mg tablet Take 81 mg by mouth daily.       amLODIPine-benazepril (LOTREL) 5-10 mg per capsule Take 1 Cap by mouth daily. Past History     Past Medical History:  Past Medical History:   Diagnosis Date    Hypertension     Seizure [facility])        Past Surgical History:  Past Surgical History:   Procedure Laterality Date    HX ORTHOPAEDIC      hip, shoulder, elbow       Family History:  History reviewed. No pertinent family history. Social History:  Social History     Tobacco Use    Smoking status: Never Smoker    Smokeless tobacco: Never Used   Substance Use Topics    Alcohol use: Not Currently    Drug use: Never       Allergies: Allergies   Allergen Reactions    Penicillins Anaphylaxis         Review of Systems   Review of Systems   Constitutional: Negative for chills and fever. HENT: Negative for congestion and rhinorrhea. Respiratory: Negative for cough and shortness of breath. Cardiovascular: Negative for chest pain. Endocrine: Negative for polyuria. Genitourinary: Negative for dysuria and frequency. Skin: Positive for wound. Neurological: Negative for dizziness, weakness and headaches. Psychiatric/Behavioral: Negative for agitation and behavioral problems. All other systems reviewed and are negative. Physical Exam   Physical Exam  Vitals signs and nursing note reviewed. Constitutional:       General: She is not in acute distress. Appearance: She is well-developed. She is not diaphoretic. Comments: 80 y.o. female   HENT:      Head: Normocephalic and atraumatic. Nose: Nose normal.      Mouth/Throat:      Mouth: Mucous membranes are moist.   Eyes:      Conjunctiva/sclera: Conjunctivae normal.      Pupils: Pupils are equal, round, and reactive to light. Neck:      Musculoskeletal: Normal range of motion. Cardiovascular:      Rate and Rhythm: Normal rate and regular rhythm. Heart sounds: Normal heart sounds. No murmur.    Pulmonary:      Effort: Pulmonary effort is normal. No respiratory distress. Breath sounds: Normal breath sounds. No wheezing. Skin:     General: Skin is warm and dry. Capillary Refill: Capillary refill takes less than 2 seconds. Neurological:      General: No focal deficit present. Mental Status: She is alert and oriented to person, place, and time. Mental status is at baseline. Psychiatric:         Mood and Affect: Mood normal.         Behavior: Behavior normal.         Thought Content: Thought content normal.         Judgment: Judgment normal.         Diagnostic Study Results     Labs -   No results found for this or any previous visit (from the past 12 hour(s)). Radiologic Studies -   No orders to display       Medical Decision Making   I am the first provider for this patient. I reviewed the vital signs, available nursing notes, past medical history, past surgical history, family history and social history. Vital Signs-Reviewed the patient's vital signs. Patient Vitals for the past 12 hrs:   Temp Pulse Resp BP SpO2   01/01/20 1136 97.8 °F (36.6 °C) 98 16 165/76 99 %       Records Reviewed: Nursing Notes and Old Medical Records    Provider Notes (Medical Decision Making):   Differential diagnoses include cellulitis, wound tension. ED Course:   Initial assessment performed. The patients presenting problems have been discussed, and they are in agreement with the care plan formulated and outlined with them. I have encouraged them to ask questions as they arise throughout their visit. Procedure Note - Suture Removal  11:30 AM   Performed by: Jessi Felder NP  4 staples (s) were removed from occipital region of scalp. No signs/sxs of infection noted. Wound healing well. Sutures removed without incident or complications. The procedure took 1-15 minutes, and pt tolerated well. Critical Care Time: None    Disposition:  DISCHARGE NOTE:  11:59 AM  The pt is ready for discharge.  The pt's signs, symptoms, diagnosis, and discharge instructions have been discussed and pt has conveyed their understanding. The pt is to follow up as recommended or return to ER should their symptoms worsen. Plan has been discussed and pt is in agreement. Sofiya Lemus NP  1/1/2020      PLAN:  1. Current Discharge Medication List        2. Follow-up Information     Follow up With Specialties Details Why Contact Info    \[facility]\"" EMERGENCY DEPT Emergency Medicine Go in 1 week As needed, If symptoms worsen [address] [facility] Internal Medicine Schedule an appointment as soon as possible for a visit in 3 days As needed, If symptoms worsen [facility]  [phone]        Return to ED if worse     Diagnosis     Clinical Impression:   1. Encounter for staple removal          Please note that this dictation was completed with MedWhat, the computer voice recognition software. Quite often unanticipated grammatical, syntax, homophones, and other interpretive errors are inadvertently transcribed by the computer software. Please disregards these errors. Please excuse any errors that have escaped final proofreading. This note will not be viewable in [address]

## 2020-11-27 ENCOUNTER — HOSPITAL ENCOUNTER (INPATIENT)
Age: 85
LOS: 6 days | Discharge: HOME HEALTH | DRG: 101 | End: 2020-12-03
Attending: EMERGENCY MEDICINE | Admitting: INTERNAL MEDICINE
Payer: MEDICARE

## 2020-11-27 ENCOUNTER — APPOINTMENT (OUTPATIENT)
Dept: GENERAL RADIOLOGY | Age: 85
DRG: 101 | End: 2020-11-27
Attending: EMERGENCY MEDICINE
Payer: MEDICARE

## 2020-11-27 ENCOUNTER — APPOINTMENT (OUTPATIENT)
Dept: CT IMAGING | Age: 85
DRG: 101 | End: 2020-11-27
Attending: EMERGENCY MEDICINE
Payer: MEDICARE

## 2020-11-27 DIAGNOSIS — G93.40 ACUTE ENCEPHALOPATHY: ICD-10-CM

## 2020-11-27 DIAGNOSIS — Z71.89 DNR (DO NOT RESUSCITATE) DISCUSSION: ICD-10-CM

## 2020-11-27 DIAGNOSIS — Z71.89 ADVANCED CARE PLANNING/COUNSELING DISCUSSION: ICD-10-CM

## 2020-11-27 DIAGNOSIS — I10 ESSENTIAL HYPERTENSION: ICD-10-CM

## 2020-11-27 DIAGNOSIS — Z71.89 GOALS OF CARE, COUNSELING/DISCUSSION: ICD-10-CM

## 2020-11-27 DIAGNOSIS — R41.82 ALTERED MENTAL STATUS, UNSPECIFIED ALTERED MENTAL STATUS TYPE: ICD-10-CM

## 2020-11-27 DIAGNOSIS — R56.9 SEIZURES (HCC): Primary | ICD-10-CM

## 2020-11-27 PROBLEM — G40.919 INTRACTABLE SEIZURES (HCC): Status: ACTIVE | Noted: 2020-11-27

## 2020-11-27 PROBLEM — E87.6 HYPOKALEMIA: Status: ACTIVE | Noted: 2020-11-27

## 2020-11-27 LAB
ALBUMIN SERPL-MCNC: 3.5 G/DL (ref 3.5–5)
ALBUMIN/GLOB SERPL: 0.9 {RATIO} (ref 1.1–2.2)
ALP SERPL-CCNC: 60 U/L (ref 45–117)
ALT SERPL-CCNC: 20 U/L (ref 12–78)
ANION GAP SERPL CALC-SCNC: 9 MMOL/L (ref 5–15)
AST SERPL-CCNC: 31 U/L (ref 15–37)
BASOPHILS # BLD: 0 K/UL (ref 0–0.1)
BASOPHILS NFR BLD: 0 % (ref 0–1)
BILIRUB SERPL-MCNC: 0.4 MG/DL (ref 0.2–1)
BUN SERPL-MCNC: 18 MG/DL (ref 6–20)
BUN/CREAT SERPL: 21 (ref 12–20)
CALCIUM SERPL-MCNC: 8.3 MG/DL (ref 8.5–10.1)
CHLORIDE SERPL-SCNC: 102 MMOL/L (ref 97–108)
CO2 SERPL-SCNC: 23 MMOL/L (ref 21–32)
CREAT SERPL-MCNC: 0.87 MG/DL (ref 0.55–1.02)
DIFFERENTIAL METHOD BLD: NORMAL
EOSINOPHIL # BLD: 0.2 K/UL (ref 0–0.4)
EOSINOPHIL NFR BLD: 2 % (ref 0–7)
ERYTHROCYTE [DISTWIDTH] IN BLOOD BY AUTOMATED COUNT: 12.2 % (ref 11.5–14.5)
GLOBULIN SER CALC-MCNC: 3.8 G/DL (ref 2–4)
GLUCOSE SERPL-MCNC: 147 MG/DL (ref 65–100)
HCT VFR BLD AUTO: 38.7 % (ref 35–47)
HGB BLD-MCNC: 13 G/DL (ref 11.5–16)
IMM GRANULOCYTES # BLD AUTO: 0 K/UL (ref 0–0.04)
IMM GRANULOCYTES NFR BLD AUTO: 0 % (ref 0–0.5)
LYMPHOCYTES # BLD: 1.9 K/UL (ref 0.8–3.5)
LYMPHOCYTES NFR BLD: 22 % (ref 12–49)
MAGNESIUM SERPL-MCNC: 1.7 MG/DL (ref 1.6–2.4)
MCH RBC QN AUTO: 32.5 PG (ref 26–34)
MCHC RBC AUTO-ENTMCNC: 33.6 G/DL (ref 30–36.5)
MCV RBC AUTO: 96.8 FL (ref 80–99)
MONOCYTES # BLD: 0.5 K/UL (ref 0–1)
MONOCYTES NFR BLD: 5 % (ref 5–13)
NEUTS SEG # BLD: 6.1 K/UL (ref 1.8–8)
NEUTS SEG NFR BLD: 71 % (ref 32–75)
NRBC # BLD: 0 K/UL (ref 0–0.01)
NRBC BLD-RTO: 0 PER 100 WBC
PHOSPHATE SERPL-MCNC: 2.4 MG/DL (ref 2.6–4.7)
PLATELET # BLD AUTO: 196 K/UL (ref 150–400)
PMV BLD AUTO: 10.5 FL (ref 8.9–12.9)
POTASSIUM SERPL-SCNC: 3.2 MMOL/L (ref 3.5–5.1)
PROT SERPL-MCNC: 7.3 G/DL (ref 6.4–8.2)
RBC # BLD AUTO: 4 M/UL (ref 3.8–5.2)
SODIUM SERPL-SCNC: 134 MMOL/L (ref 136–145)
TROPONIN I SERPL-MCNC: <0.05 NG/ML
WBC # BLD AUTO: 8.6 K/UL (ref 3.6–11)

## 2020-11-27 PROCEDURE — 80053 COMPREHEN METABOLIC PANEL: CPT

## 2020-11-27 PROCEDURE — 84100 ASSAY OF PHOSPHORUS: CPT

## 2020-11-27 PROCEDURE — 95816 EEG AWAKE AND DROWSY: CPT | Performed by: PSYCHIATRY & NEUROLOGY

## 2020-11-27 PROCEDURE — 36415 COLL VENOUS BLD VENIPUNCTURE: CPT

## 2020-11-27 PROCEDURE — 83735 ASSAY OF MAGNESIUM: CPT

## 2020-11-27 PROCEDURE — 99222 1ST HOSP IP/OBS MODERATE 55: CPT | Performed by: NURSE PRACTITIONER

## 2020-11-27 PROCEDURE — 85025 COMPLETE CBC W/AUTO DIFF WBC: CPT

## 2020-11-27 PROCEDURE — 95816 EEG AWAKE AND DROWSY: CPT | Performed by: INTERNAL MEDICINE

## 2020-11-27 PROCEDURE — 99285 EMERGENCY DEPT VISIT HI MDM: CPT

## 2020-11-27 PROCEDURE — 74011250636 HC RX REV CODE- 250/636: Performed by: INTERNAL MEDICINE

## 2020-11-27 PROCEDURE — 84484 ASSAY OF TROPONIN QUANT: CPT

## 2020-11-27 PROCEDURE — 74011250636 HC RX REV CODE- 250/636

## 2020-11-27 PROCEDURE — 74011250636 HC RX REV CODE- 250/636: Performed by: EMERGENCY MEDICINE

## 2020-11-27 PROCEDURE — 99223 1ST HOSP IP/OBS HIGH 75: CPT | Performed by: PSYCHIATRY & NEUROLOGY

## 2020-11-27 PROCEDURE — 70450 CT HEAD/BRAIN W/O DYE: CPT

## 2020-11-27 PROCEDURE — 74011250636 HC RX REV CODE- 250/636: Performed by: NURSE PRACTITIONER

## 2020-11-27 PROCEDURE — 74011250637 HC RX REV CODE- 250/637: Performed by: NURSE PRACTITIONER

## 2020-11-27 PROCEDURE — 71045 X-RAY EXAM CHEST 1 VIEW: CPT

## 2020-11-27 PROCEDURE — 65660000000 HC RM CCU STEPDOWN

## 2020-11-27 PROCEDURE — 93005 ELECTROCARDIOGRAM TRACING: CPT

## 2020-11-27 PROCEDURE — 96365 THER/PROPH/DIAG IV INF INIT: CPT

## 2020-11-27 PROCEDURE — 96367 TX/PROPH/DG ADDL SEQ IV INF: CPT

## 2020-11-27 PROCEDURE — 96375 TX/PRO/DX INJ NEW DRUG ADDON: CPT

## 2020-11-27 RX ORDER — POTASSIUM CHLORIDE 7.45 MG/ML
10 INJECTION INTRAVENOUS
Status: DISPENSED | OUTPATIENT
Start: 2020-11-27 | End: 2020-11-27

## 2020-11-27 RX ORDER — LEVETIRACETAM 10 MG/ML
1000 INJECTION INTRAVASCULAR
Status: COMPLETED | OUTPATIENT
Start: 2020-11-27 | End: 2020-11-27

## 2020-11-27 RX ORDER — SODIUM CHLORIDE 9 MG/ML
50 INJECTION, SOLUTION INTRAVENOUS CONTINUOUS
Status: DISCONTINUED | OUTPATIENT
Start: 2020-11-27 | End: 2020-11-27

## 2020-11-27 RX ORDER — ACETAMINOPHEN 325 MG/1
650 TABLET ORAL
Status: DISCONTINUED | OUTPATIENT
Start: 2020-11-27 | End: 2020-12-03 | Stop reason: HOSPADM

## 2020-11-27 RX ORDER — ACETAMINOPHEN 650 MG/1
650 SUPPOSITORY RECTAL
Status: DISCONTINUED | OUTPATIENT
Start: 2020-11-27 | End: 2020-12-03 | Stop reason: HOSPADM

## 2020-11-27 RX ORDER — LORAZEPAM 2 MG/ML
0.5 INJECTION INTRAMUSCULAR
Status: DISCONTINUED | OUTPATIENT
Start: 2020-11-27 | End: 2020-12-03 | Stop reason: HOSPADM

## 2020-11-27 RX ORDER — GUAIFENESIN 100 MG/5ML
81 LIQUID (ML) ORAL DAILY
Status: DISCONTINUED | OUTPATIENT
Start: 2020-11-27 | End: 2020-11-27

## 2020-11-27 RX ORDER — ATORVASTATIN CALCIUM 40 MG/1
40 TABLET, FILM COATED ORAL
Status: DISCONTINUED | OUTPATIENT
Start: 2020-11-27 | End: 2020-12-03 | Stop reason: HOSPADM

## 2020-11-27 RX ORDER — HEPARIN SODIUM 5000 [USP'U]/ML
5000 INJECTION, SOLUTION INTRAVENOUS; SUBCUTANEOUS EVERY 12 HOURS
Status: DISCONTINUED | OUTPATIENT
Start: 2020-11-27 | End: 2020-12-03 | Stop reason: HOSPADM

## 2020-11-27 RX ORDER — DEXTROSE, SODIUM CHLORIDE, AND POTASSIUM CHLORIDE 5; .9; .15 G/100ML; G/100ML; G/100ML
50 INJECTION INTRAVENOUS CONTINUOUS
Status: DISCONTINUED | OUTPATIENT
Start: 2020-11-27 | End: 2020-11-29

## 2020-11-27 RX ORDER — HYDRALAZINE HYDROCHLORIDE 20 MG/ML
5 INJECTION INTRAMUSCULAR; INTRAVENOUS
Status: DISCONTINUED | OUTPATIENT
Start: 2020-11-27 | End: 2020-12-03 | Stop reason: HOSPADM

## 2020-11-27 RX ORDER — LEVETIRACETAM 5 MG/ML
500 INJECTION INTRAVASCULAR EVERY 12 HOURS
Status: DISCONTINUED | OUTPATIENT
Start: 2020-11-27 | End: 2020-12-01

## 2020-11-27 RX ORDER — ENOXAPARIN SODIUM 100 MG/ML
30 INJECTION SUBCUTANEOUS EVERY 24 HOURS
Status: DISCONTINUED | OUTPATIENT
Start: 2020-11-27 | End: 2020-11-27

## 2020-11-27 RX ORDER — LORAZEPAM 2 MG/ML
INJECTION INTRAMUSCULAR
Status: COMPLETED
Start: 2020-11-27 | End: 2020-11-27

## 2020-11-27 RX ORDER — AMLODIPINE AND BENAZEPRIL HYDROCHLORIDE 5; 10 MG/1; MG/1
1 CAPSULE ORAL DAILY
Status: DISCONTINUED | OUTPATIENT
Start: 2020-11-27 | End: 2020-11-27

## 2020-11-27 RX ORDER — ASPIRIN 300 MG/1
300 SUPPOSITORY RECTAL DAILY
Status: DISCONTINUED | OUTPATIENT
Start: 2020-11-27 | End: 2020-12-01

## 2020-11-27 RX ORDER — LORAZEPAM 2 MG/ML
2 INJECTION INTRAMUSCULAR
Status: COMPLETED | OUTPATIENT
Start: 2020-11-27 | End: 2020-11-27

## 2020-11-27 RX ADMIN — LORAZEPAM 2 MG: 2 INJECTION INTRAMUSCULAR at 02:24

## 2020-11-27 RX ADMIN — LEVETIRACETAM 500 MG: 5 INJECTION INTRAVENOUS at 21:33

## 2020-11-27 RX ADMIN — ASPIRIN 300 MG: 300 SUPPOSITORY RECTAL at 10:10

## 2020-11-27 RX ADMIN — LEVETIRACETAM 500 MG: 5 INJECTION INTRAVENOUS at 08:50

## 2020-11-27 RX ADMIN — SODIUM CHLORIDE 50 ML/HR: 900 INJECTION, SOLUTION INTRAVENOUS at 10:14

## 2020-11-27 RX ADMIN — LEVETIRACETAM 1000 MG: 10 INJECTION INTRAVENOUS at 02:23

## 2020-11-27 RX ADMIN — HYDRALAZINE HYDROCHLORIDE 5 MG: 20 INJECTION INTRAMUSCULAR; INTRAVENOUS at 08:50

## 2020-11-27 RX ADMIN — LORAZEPAM 2 MG: 2 INJECTION INTRAMUSCULAR; INTRAVENOUS at 02:24

## 2020-11-27 RX ADMIN — SODIUM CHLORIDE 1000 ML: 900 INJECTION, SOLUTION INTRAVENOUS at 03:02

## 2020-11-27 RX ADMIN — POTASSIUM CHLORIDE, DEXTROSE MONOHYDRATE AND SODIUM CHLORIDE 50 ML/HR: 150; 5; 900 INJECTION, SOLUTION INTRAVENOUS at 21:24

## 2020-11-27 RX ADMIN — POTASSIUM CHLORIDE 10 MEQ: 10 INJECTION, SOLUTION INTRAVENOUS at 06:19

## 2020-11-27 RX ADMIN — HEPARIN SODIUM 5000 UNITS: 5000 INJECTION INTRAVENOUS; SUBCUTANEOUS at 21:36

## 2020-11-27 NOTE — PROGRESS NOTES
Order acknowledged, chart reviewed, and spoke with nursing. Per RN, patient is not following commands and not appropriate for PT consult at this time. Will defer and continue to follow.     Haris Waldron, PT

## 2020-11-27 NOTE — PROGRESS NOTES

## 2020-11-27 NOTE — ROUTINE PROCESS
Bedside, Verbal and Written shift change report given to Frankie Higgins RN  (oncoming nurse) by Len Angulo RN (offgoing nurse). Report included the following information SBAR, Kardex, MAR and Recent Results.

## 2020-11-27 NOTE — PROGRESS NOTES
Speech pathology note  Reviewed chart and note patient admitted with seizures, and per RN, patient currently post-ictal. Patient not appropriate for PO consideration at this time secondary to AMS. If mental status improves over the weekend, recommend RN complete the STAND. SLP will follow for swallowing evaluation as medically appropriate. Thank you.     Sumeet Fontaine., CCC-SLP

## 2020-11-27 NOTE — PROGRESS NOTES
Spiritual Care Assessment/Progress Note  [facility]      NAME: Yanely Chavez      MRN: [id number]  AGE: 80 y.o. SEX: female  Amish Affiliation: Other   Language: English     11/27/2020     Total Time (in minutes): 5     Spiritual Assessment begun in [facility] through conversation with:         []Patient        [] Family    [] Friend(s)        Reason for Consult: Palliative Care, Initial/Spiritual Assessment     Spiritual beliefs: (Please include comment if needed)     [] Identifies with a maame tradition:         [] Supported by a maame community:            [] Claims no spiritual orientation:           [] Seeking spiritual identity:                [] Adheres to an individual form of spirituality:           [x] Not able to assess:                           Identified resources for coping:      [] Prayer                               [] Music                  [] Guided Imagery     [] Family/friends                 [] Pet visits     [] Devotional reading                         [x] Unknown     [] Other:                                             Interventions offered during this visit: (See comments for more details)                Plan of Care:     [] Support spiritual and/or cultural needs    [] Support AMD and/or advance care planning process      [] Support grieving process   [] Coordinate Rites and/or Rituals    [] Coordination with community clergy   [] No spiritual needs identified at this time   [] Detailed Plan of Care below (See Comments)  [] Make referral to Music Therapy  [] Make referral to Pet Therapy     [] Make referral to Addiction services  [] Make referral to [facility]  [] Make referral to Spiritual Care Partner  [] No future visits requested        [] Follow up upon further referrals     Comments: Attempted Initial Spiritual Assessment for this pt in [place] [address]. Pt was unable to be assessed at this time.   No family/friends present at time of visit. Contact Spiritual Care Services for any spiritual or emotional support needs. Maikol Martin MDiv.  Staff   Request  Support/Spiritual Care Services via [facility]

## 2020-11-27 NOTE — ED NOTES
TRANSFER - OUT REPORT:    Verbal report given to Frankie Higgins RN(name) on [facility]  being transferred to Neuro(unit) for routine progression of care       Report consisted of patients Situation, Background, Assessment and   Recommendations(SBAR). Information from the following report(s) SBAR, Kardex, ED Summary, Procedure Summary, MAR, Recent Results and Cardiac Rhythm NSR was reviewed with the receiving nurse. Lines:   Peripheral IV 11/27/20 Left Antecubital (Active)        Opportunity for questions and clarification was provided.       Patient transported with:   [employer]

## 2020-11-27 NOTE — CONSULTS
Neurology Note    Patient ID:  Chari Mejia  [id number]  71 y.o.  7/9/1923      Date of Consultation:  November 27, 2020    Referring Physician: Dr. Es Baker    Reason for Consultation:  seizures    Subjective: No verbal output this morning       History of Present Illness:   Chari Mejia is a 80 y.o. female with a history of hypertension and seizures who was admitted from home after a bout of unresponsiveness. In the emergency department the patient was noted to have recurrent tonic-clonic seizures. She was noted to be markedly hypertensive in the emergency department. One noted blood pressure was 189/120. Patient did receive Ativan in the emergency department and is notably sedated. It was noted that the patient had not been taking her blood pressure medicine. Unclear baseline neurological status. Past Medical History:   Diagnosis Date    Hypertension     Seizure ([address]     Seizures ([address]         Past Surgical History:   Procedure Laterality Date    HX ORTHOPAEDIC      hip, shoulder, elbow        History reviewed. No pertinent family history. Social History     Tobacco Use    Smoking status: Never Smoker    Smokeless tobacco: Never Used   Substance Use Topics    Alcohol use: Not Currently        Allergies   Allergen Reactions    Penicillins Anaphylaxis        Prior to Admission medications    Medication Sig Start Date End Date Taking? Authorizing Provider   acetaminophen (TYLENOL) 500 mg capsule Take 500 mg by mouth Before breakfast, lunch, dinner and at bedtime. Patient takes 1000 mg QAM, 500 mg TIDCC, and 500 mg QHS   Yes Provider, Historical   acetaminophen (TYLENOL) 500 mg capsule Take 1,000 mg by mouth daily. Patient takes 1000 mg QAM, 500 mg TIDCC, and 500 mg QHS   Yes Provider, Historical   omega 3-dha-epa-fish oil (FISH OIL) 100-160-1,000 mg cap Take 1 Cap by mouth daily. Yes Provider, Historical   aspirin delayed-release 81 mg tablet Take 81 mg by mouth daily. Yes Other, MD Lyn   amLODIPine-benazepril (LOTREL) 5-10 mg per capsule Take 1 Cap by mouth daily. Yes Other, MD Sarai Nicholson misc 1 Units by Does Not Apply route as needed (unsteady gait). 12/16/19   Cleo Stevenson MD   calcium-cholecalciferol, D3, (CALTRATE 600+D) tablet Take 1 Tab by mouth daily. Provider, Historical   vitamin V-K-O-lutein-minerals (OCUVITE) tablet Take 1 Tab by mouth daily. Provider, Historical   multivitamin (ONE A DAY) tablet Take 1 Tab by mouth daily. Provider, Historical   meclizine (ANTIVERT) 25 mg tablet Take 1 Tab by mouth three (3) times daily as needed for Dizziness.  12/16/19   Harman Lopez,      Current Facility-Administered Medications   Medication Dose Route Frequency    acetaminophen (TYLENOL) tablet 650 mg  650 mg Oral Q4H PRN    Or    acetaminophen (TYLENOL) solution 650 mg  650 mg Per NG tube Q4H PRN    Or    acetaminophen (TYLENOL) suppository 650 mg  650 mg Rectal Q4H PRN    atorvastatin (LIPITOR) tablet 40 mg  40 mg Oral QHS    enoxaparin (LOVENOX) injection 30 mg  30 mg SubCUTAneous Q24H    hydrALAZINE (APRESOLINE) 20 mg/mL injection 5 mg  5 mg IntraVENous Q6H PRN    levETIRAcetam (KEPPRA) 500 mg in saline (iso-osm) 100 mL IVPB (premix)  500 mg IntraVENous Q12H    LORazepam (ATIVAN) injection 0.5 mg  0.5 mg IntraVENous Q2H PRN    aspirin (ASA) suppository 300 mg  300 mg Rectal DAILY    0.9% sodium chloride infusion  50 mL/hr IntraVENous CONTINUOUS       Review of Systems:    [x]Unable to obtain  ROS due to  [x]mental status change  [x]sedated   []intubated    Objective:     Visit Vitals  BP (!) 172/73 (BP 1 Location: Left leg, BP Patient Position: Lying left side;During activity)   Pulse 88   Temp 97.8 °F (36.6 °C)   Resp 18   Ht 4' 10\" (1.473 m)   Wt 82 lb 14.3 oz (37.6 kg)   SpO2 97%   BMI 17.32 kg/m²       Physical Exam:      General:  appears well nourished in no acute distress  Neck: no carotid bruits  Lungs: clear to auscultation  Heart:  no murmurs, regular rate  Lower extremity: peripheral pulses palpable and no edema  Skin: intact. Mild bruising    Neurological exam:    The patient is arousable. There was no verbal output. She would not follow any commands. Cranial nerves:   II-XII were tested    Pupils were both reactive. Irregular shape on the right. The patient would not cooperate with funduscopic testing. Roving eye movements  Facial sensation: Symmetric grimace to painful stimulation  Facial motor: Symmetric grimace to painful stimulation    Tongue: The patient would not protrude her tongue    Motor: Tone normal    No evidence of fasciculations  The patient would not participate with full motor testing. The patient does have a withdrawal of all 4 extremities to painful stimulation. Unclear if there is a subtle asymmetry. Sensory:  Upper extremity: intact to pp, light touch, and vibration > 10 seconds  Lower extremity: intact to pp, light touch, and vibration > 10 seconds    Reflexes:    Right Left  Biceps  2 2  Triceps 2 2  Brachiorad. 2 2  Patella  2 2  Achilles 2 2    Plantar response:  flexor bilaterally      Cerebellar testing:  no tremor apparent,     Gait: This was not assessed due to cognitive status    Labs:     Lab Results   Component Value Date/Time    Sodium 134 (L) 11/27/2020 01:56 AM    Potassium 3.2 (L) 11/27/2020 01:56 AM    Chloride 102 11/27/2020 01:56 AM    Glucose 147 (H) 11/27/2020 01:56 AM    BUN 18 11/27/2020 01:56 AM    Creatinine 0.87 11/27/2020 01:56 AM    Calcium 8.3 (L) 11/27/2020 01:56 AM    WBC 8.6 11/27/2020 01:56 AM    HCT 38.7 11/27/2020 01:56 AM    HGB 13.0 11/27/2020 01:56 AM    PLATELET 984 46/82/7312 01:56 AM       Imaging:    No results found for this or any previous visit. Results from [place] encounter on 11/27/20   CT HEAD WO CONT    Narrative EXAM: CT HEAD WO CONT    INDICATION: seizure    COMPARISON: 12/16/2019. CONTRAST: None.     TECHNIQUE: Unenhanced CT of the head was performed using 5 mm images. Brain and  bone windows were generated. Coronal and sagittal reformats. CT dose reduction  was achieved through use of a standardized protocol tailored for this  examination and automatic exposure control for dose modulation. FINDINGS:  The ventricles and sulci are normal in size, shape and configuration. . Moderate  subcortical deep white matter hypodensities. . There is no intracranial  hemorrhage, extra-axial collection, or mass effect. The basilar cisterns are  open. No CT evidence of acute infarct. The bone windows demonstrate no abnormalities. The visualized portions of the  paranasal sinuses and mastoid air cells are clear. Impression IMPRESSION:   Moderate chronic small vessel ischemic disease. No acute intracranial  abnormality         I did independently review the head CT from 11/27/2020. There is moderate chronic small vessel ischemic changes with a significant amount of atrophy. Assessment and Plan:    The patient is a pleasant 80-year-old female with history of hypertension who was admitted with alteration of mental status and seizure activity. Her current neurological examination is sedated. New onset seizure:    Her last seizure was reportedly over 10 years ago and she is not on any seizure medication at home. I agree with continuing Keppra 500 mg twice a day for the time being. Etiology for the seizure includes hypertensive emergency, electrolyte abnormality, TIA/stroke, or infection such as UTI or pneumonia impacting neurological function. Aspirin daily  Patient should receive a brain MRI. EEG scheduled for this a.m. Correct electrolyte abnormalities  I would recommend obtaining a urinalysis    Accelerated hypertension: Aggressive control with goal systolic blood pressure under 140/90      Pending the results of the initial testing, additional tests or medications may be necessary. Neurology will continue to follow along closely. Active Problems:    Intractable seizures ([facility] (11/27/2020)      Hypokalemia (11/27/2020)      HTN (hypertension) (11/27/2020)      Acute encephalopathy (11/27/2020)                   Signed By:  Soledad Skinner DO FAAN    November 27, 2020

## 2020-11-27 NOTE — PROGRESS NOTES
Problem: Pressure Injury - Risk of  Goal: *Prevention of pressure injury  Description: Document Danilo Scale and appropriate interventions in the flowsheet.   Outcome: Progressing Towards Goal  Note: Pressure Injury Interventions:  Sensory Interventions: Assess changes in LOC, Check visual cues for pain    Moisture Interventions: Absorbent underpads, Apply protective barrier, creams and emollients, Check for incontinence Q2 hours and as needed    Activity Interventions: Pressure redistribution bed/mattress(bed type)    Mobility Interventions: Pressure redistribution bed/mattress (bed type)    Nutrition Interventions: Offer support with meals,snacks and hydration    Friction and Shear Interventions: Apply protective barrier, creams and emollients                Problem: Patient Education: Go to Patient Education Activity  Goal: Patient/Family Education  Outcome: Progressing Towards Goal     Problem: Seizure Disorder (Adult)  Goal: *STG: Remains free of seizure activity  Outcome: Progressing Towards Goal  Goal: *STG: Maintains lab values within therapeutic range  Outcome: Progressing Towards Goal  Goal: *STG/LTG: Complies with medication therapy  Outcome: Progressing Towards Goal  Goal: *STG: Remains free of injury during seizure activity  Outcome: Progressing Towards Goal  Goal: *STG: Remains safe in hospital  Outcome: Progressing Towards Goal  Goal: Interventions  Outcome: Progressing Towards Goal     Problem: Patient Education: Go to Patient Education Activity  Goal: Patient/Family Education  Outcome: Progressing Towards Goal

## 2020-11-27 NOTE — PROGRESS NOTES
Comprehensive Nutrition Assessment    Type and Reason for Visit: Initial(Low BMI)    Nutrition Recommendations/Plan:   · Progress diet as medically feasible and safe per SLP. · RD ordered Ensure Clear for now while remains on clear liquids diet. Progress accordingly as diet advances. · Please document % meals and supplements consumed in flowsheet I/O's under intake. Nutrition Assessment:     11/27: Chart reviewed; med noted for intractable seizures after being found unresponsive prior to being brought into the hospital, hypertensive urgency. Pt currently on clear liquids with plans to have and MRI of the brain. No data found. Last Weight Metric  Weight Loss Metrics 11/27/2020 1/1/2020 12/16/2019   Today's Wt 82 lb 14.3 oz 101 lb 100 lb 15.5 oz   BMI 17.32 kg/m2 19.73 kg/m2 -     Estimated Daily Nutrient Needs:  Energy (kcal): 1042 (BMR (648 x 1. 3AF) + 250 kcals; Weight Used for Energy Requirements: Current  Protein (g): 45 (1.2 g/kg bw); Weight Used for Protein Requirements: Current  Fluid (ml/day): 1000 ml/day; Method Used for Fluid Requirements: 1 ml/kcal    Nutrition Related Findings:  BM: none documented; Labs: K+ 3.2;  Meds: lovenox, lipitor      Wounds:    None       Current Nutrition Therapies:  DIET CLEAR LIQUID  DIET NUTRITIONAL SUPPLEMENTS Breakfast, Dinner; Ensure Clear    Anthropometric Measures:  · Height:  4' 10\" (147.3 cm)  · Current Body Wt:  37.6 kg (82 lb 14.3 oz)   · Ideal Body Wt:  90 lbs:  92.1 %   · BMI Category:  Underweight (BMI less than 22) age over 72       Nutrition Diagnosis:   · Inadequate protein-energy intake related to (current medical condition, advanced age) as evidenced by NPO or clear liquid status due to medical condition    Nutrition Interventions:   Food and/or Nutrient Delivery: Continue current diet, Start oral nutrition supplement(progress diet as medically feasible)  Nutrition Education and Counseling: No recommendations at this time  Coordination of Nutrition Care: No recommendation at this time    Goals:  Trend PO intake at least 50% of meals + 240 ml ONS next 3-5 days       Nutrition Monitoring and Evaluation:   Behavioral-Environmental Outcomes: None identified  Food/Nutrient Intake Outcomes: Food and nutrient intake, Diet advancement/tolerance, Supplement intake  Physical Signs/Symptoms Outcomes: Biochemical data, Weight    Discharge Planning:     Too soon to determine     Electronically signed by Valente Kurtz RD on 11/27/2020 at 12:57 PM

## 2020-11-27 NOTE — PROCEDURES
EEG REPORT    Patient Name: Angelica Adams  : 1923  Age: 80 y.o. Ordering physician: Dr. Jeanette Feliciano  Date of EE2020  11:50 0- 12:12  Diagnosis: encephalopathy  Interpreting physician: Dodie Johnson. ELIOT Irizarry FAAN    Procedure: EEG    CLINICAL INDICATION: The patient is a 80 y.o. female who is being evaluated for baseline electro cerebral activities and to rule out seizure focus. Current Facility-Administered Medications   Medication Dose Route Frequency    acetaminophen (TYLENOL) tablet 650 mg  650 mg Oral Q4H PRN    Or    acetaminophen (TYLENOL) solution 650 mg  650 mg Per NG tube Q4H PRN    Or    acetaminophen (TYLENOL) suppository 650 mg  650 mg Rectal Q4H PRN    atorvastatin (LIPITOR) tablet 40 mg  40 mg Oral QHS    enoxaparin (LOVENOX) injection 30 mg  30 mg SubCUTAneous Q24H    hydrALAZINE (APRESOLINE) 20 mg/mL injection 5 mg  5 mg IntraVENous Q6H PRN    levETIRAcetam (KEPPRA) 500 mg in saline (iso-osm) 100 mL IVPB (premix)  500 mg IntraVENous Q12H    LORazepam (ATIVAN) injection 0.5 mg  0.5 mg IntraVENous Q2H PRN    aspirin (ASA) suppository 300 mg  300 mg Rectal DAILY    0.9% sodium chloride infusion  50 mL/hr IntraVENous CONTINUOUS           DESCRIPTION OF PROCEDURE:     This is a digitally recorded electroencephalogram  Electrodes were applied in accordance with the international 10-20 system of electrode placement. 18 channels of scalp EEG are recorded  A channel was used for EoG  Another channel was used for ECG   The data is stored digitally and reviewed in reformatted montages for optimal display  EEG  was reviewed in both bipolar and referential montages    Description of Activity: The background of this recording contains no well-formed alpha activity seen. There was a mixtures of diffuse theta and delta activity identified throughout the study. There was a small amount of beta activity seen.       Throughout the recording, there were no clear areas of focal slowing nor spike or spike-and-wave discharges seen. Electrical inference artifact was noted in the study. Hyperventilation was not performed. Photic stimulation produced no response in the posterior head regions. Clinical Interpretation: This EEG is abnormal. There is generalized slowing as seen in encephalopathies. There is no focal asymmetry, seizures or epileptiform discharges seen. Clinical correlation is recommended        ELIOT Salmeron

## 2020-11-27 NOTE — PROGRESS NOTES
Problem: Pressure Injury - Risk of  Goal: *Prevention of pressure injury  Description: Document Danilo Scale and appropriate interventions in the flowsheet.   11/27/2020 1827 by You Yee RN  Outcome: Progressing Towards Goal  Note: Pressure Injury Interventions:  Sensory Interventions: Assess changes in LOC, Check visual cues for pain    Moisture Interventions: Absorbent underpads, Apply protective barrier, creams and emollients, Check for incontinence Q2 hours and as needed    Activity Interventions: Pressure redistribution bed/mattress(bed type)    Mobility Interventions: Pressure redistribution bed/mattress (bed type)    Nutrition Interventions: Offer support with meals,snacks and hydration    Friction and Shear Interventions: Apply protective barrier, creams and emollients             11/27/2020 1822 by You Yee RN  Outcome: Progressing Towards Goal  Note: Pressure Injury Interventions:  Sensory Interventions: Assess changes in LOC, Check visual cues for pain    Moisture Interventions: Absorbent underpads, Apply protective barrier, creams and emollients, Check for incontinence Q2 hours and as needed    Activity Interventions: Pressure redistribution bed/mattress(bed type)    Mobility Interventions: Pressure redistribution bed/mattress (bed type)    Nutrition Interventions: Offer support with meals,snacks and hydration    Friction and Shear Interventions: Apply protective barrier, creams and emollients                Problem: Patient Education: Go to Patient Education Activity  Goal: Patient/Family Education  11/27/2020 1827 by You Yee RN  Outcome: Progressing Towards Goal  11/27/2020 1822 by You Yee RN  Outcome: Progressing Towards Goal     Problem: Seizure Disorder (Adult)  Goal: *STG: Remains free of seizure activity  11/27/2020 1827 by You Yee RN  Outcome: Progressing Towards Goal  11/27/2020 1822 by You Yee RN  Outcome: Progressing Towards Goal  Goal: *STG: Maintains lab values within therapeutic range  11/27/2020 1827 by Everett Harrison RN  Outcome: Progressing Towards Goal  11/27/2020 1822 by Everett Harrison RN  Outcome: Progressing Towards Goal  Goal: *STG/LTG: Complies with medication therapy  11/27/2020 1827 by Everett Harrison RN  Outcome: Progressing Towards Goal  11/27/2020 1822 by Everett Harrison RN  Outcome: Progressing Towards Goal  Goal: *STG: Remains free of injury during seizure activity  11/27/2020 1827 by Everett Harrison RN  Outcome: Progressing Towards Goal  11/27/2020 1822 by Everett Harrison RN  Outcome: Progressing Towards Goal  Goal: *STG: Remains safe in hospital  11/27/2020 1827 by Everett Harrison RN  Outcome: Progressing Towards Goal  11/27/2020 1822 by Everett Harrison RN  Outcome: Progressing Towards Goal  Goal: Interventions  11/27/2020 1827 by Everett Harrison RN  Outcome: Progressing Towards Goal  11/27/2020 1822 by Everett Harrison RN  Outcome: Progressing Towards Goal     Problem: Patient Education: Go to Patient Education Activity  Goal: Patient/Family Education  11/27/2020 1827 by Everett Harrison RN  Outcome: Progressing Towards Goal  11/27/2020 1822 by Everett Harrison RN  Outcome: Progressing Towards Goal

## 2020-11-27 NOTE — H&P
Hospitalist Admission Note    NAME: Oralia Canada   :  1923   MRN:  [id number]     Date/Time:  2020 5:18 AM    Patient PCP: Lyn Oro MD  ______________________________________________________________________  Given the patient's current clinical presentation, I have a high level of concern for decompensation if discharged from the emergency department. Complex decision making was performed, which includes reviewing the patient's available past medical records, laboratory results, and x-ray films. My assessment of this patient's clinical condition and my plan of care is as follows. Assessment / Plan:  Intractable Seizure (At home and witnessed in ED)  Acute Encephalopathy POA   Concern for PRESS vs seizure DO vs cva causing seizure  Did had seizures > 10 years ago due to trauma related, seems started on anti seizure meds but taken off and had been off meds for years now  Admit to Neuro[facility]  Check MRI brain with and without contrast  Check EEG  Loaded with IV Keppra in ED, will place 500mg [id number]  Neurology consult  Continue ASA  Px Statins until CVA ruled out  BP control    Hypertensive Emergency with Seizures  Concern if this could be press  Per son, pt didn't take meds for last couple of days  Resume home meds  PRN IV hydralazine    Hypokalemia   Replete IV and monitor    Code Status: Full d/w son, will consult palliative care for goals of care. May need to address goals of care with patient when she is awake    Surrogate Decision Maker: Kolby Sandoval    DVT Prophylaxis: Lovenox    Baseline: functional      Subjective:   CHIEF COMPLAINT: Seizures    HISTORY OF PRESENT ILLNESS:     Tiffany Gilliam is a 80 y.o.  female who presents with seizure at home. As per son who I spoke over the phone who lives with the patient claims he sound her unresponsive. In ED pt noted to have recurrent tonic clonic seizures per ED and bedside RN report.  Son also reported that only prescription medication she is on is her blood pressure medication which she didn't take for lest couple of days. Pt is sedated under influence of ativan so history is limited. She noted to have high blood pressure in ED. We were asked to admit for work up and evaluation of the above problems. Past Medical History:   Diagnosis Date    Hypertension     Seizure ([address]     Seizures ([facility] [address]         Past Surgical History:   Procedure Laterality Date    HX ORTHOPAEDIC      hip, shoulder, elbow       Social History     Tobacco Use    Smoking status: Never Smoker    Smokeless tobacco: Never Used   Substance Use Topics    Alcohol use: Not Currently      Family history: unable to obtain due to altered mental status    Allergies   Allergen Reactions    Penicillins Anaphylaxis        Prior to Admission medications    Medication Sig Start Date End Date Taking? Authorizing Provider   acetaminophen (TYLENOL) 500 mg capsule Take 500 mg by mouth Before breakfast, lunch, dinner and at bedtime. Patient takes 1000 mg QAM, 500 mg TIDCC, and 500 mg QHS   Yes Provider, Historical   acetaminophen (TYLENOL) 500 mg capsule Take 1,000 mg by mouth daily. Patient takes 1000 mg QAM, 500 mg TIDCC, and 500 mg QHS   Yes Provider, Historical   omega 3-dha-epa-fish oil (FISH OIL) 100-160-1,000 mg cap Take 1 Cap by mouth daily. Yes Provider, Historical   aspirin delayed-release 81 mg tablet Take 81 mg by mouth daily. Yes Other, MD Lyn   amLODIPine-benazepril (LOTREL) 5-10 mg per capsule Take 1 Cap by mouth daily. Yes Other, MD Jovany Nicholson misc 1 Units by Does Not Apply route as needed (unsteady gait). 12/16/19   Cleo Stevenson MD   calcium-cholecalciferol, D3, (CALTRATE 600+D) tablet Take 1 Tab by mouth daily. Provider, Historical   vitamin D-X-U-lutein-minerals (OCUVITE) tablet Take 1 Tab by mouth daily. Provider, Historical   multivitamin (ONE A DAY) tablet Take 1 Tab by mouth daily.     Provider, Historical   meclizine (ANTIVERT) 25 mg tablet Take 1 Tab by mouth three (3) times daily as needed for Dizziness. 12/16/19   Monica Powell,        REVIEW OF SYSTEMS:     I am not able to complete the review of systems because: The patient is intubated and sedated   y The patient has altered mental status due to his acute medical problems    The patient has baseline aphasia from prior stroke(s)    The patient has baseline dementia and is not reliable historian    The patient is in acute medical distress and unable to provide information             Objective:   VITALS:    Visit Vitals  BP (!) 117/38   Pulse 90   Temp 98.4 °F (36.9 °C)   Resp 18   Ht 4' 10\" (1.473 m)   Wt 37.6 kg (82 lb 14.3 oz)   SpO2 95%   BMI 17.32 kg/m²       PHYSICAL EXAM:    General:    Sedated, no distress, appears stated age. HEENT: Atraumatic, anicteric sclerae, pink conjunctivae     No oral ulcers, mucosa moist, throat clear, dentition fair  Neck:  Supple, symmetrical,  thyroid: non tender  Lungs:   Clear to auscultation bilaterally. No Wheezing or Rhonchi. No rales. Chest wall:  No tenderness  No Accessory muscle use. Heart:   Regular  rhythm,  No  murmur   No edema  Abdomen:   Soft, non-tender. Not distended. Bowel sounds normal  Extremities: No cyanosis. No clubbing,      Skin turgor normal, Capillary refill normal, Radial dial pulse 2+  Skin:     Not pale.   Not Jaundiced  No rashes   Psych:  Exam limited as pt is sedated  Neurologic: Exam limited as pt is sedated    _______________________________________________________________________  Care Plan discussed with:    Comments   Patient y    Family      RN y    Care Manager                    Consultant:  jaun HIGUERA physician   _______________________________________________________________________  Expected  Disposition:   Home with Family y   HH/PT/OT/RN    SNF/LTC    JOSHUA    ________________________________________________________________________  TOTAL TIME: 61 Minutes    Critical Care Provided     Minutes non procedure based      Comments    y Reviewed previous records   >50% of visit spent in counseling and coordination of care y Discussion with family and questions answered       ________________________________________________________________________  Signed: Bethany Elder MD    Procedures: see electronic medical records for all procedures/Xrays and details which were not copied into this note but were reviewed prior to creation of Plan. LAB DATA REVIEWED:    Recent Results (from the past 24 hour(s))   EKG, 12 LEAD, INITIAL    Collection Time: 11/27/20  1:53 AM   Result Value Ref Range    Ventricular Rate 100 BPM    Atrial Rate 100 BPM    P-R Interval 216 ms    QRS Duration 88 ms    Q-T Interval 358 ms    QTC Calculation (Bezet) 461 ms    Calculated P Axis 61 degrees    Calculated R Axis -39 degrees    Calculated T Axis 47 degrees    Diagnosis       Sinus rhythm with 1st degree AV block  Possible Left atrial enlargement  Left axis deviation  Nonspecific ST and T wave abnormality  When compared with ECG of 16-DEC-2019 04:38,  AK interval has increased     CBC WITH AUTOMATED DIFF    Collection Time: 11/27/20  1:56 AM   Result Value Ref Range    WBC 8.6 3.6 - 11.0 K/uL    RBC 4.00 3.80 - 5.20 M/uL    HGB 13.0 11.5 - 16.0 g/dL    HCT 38.7 35.0 - 47.0 %    MCV 96.8 80.0 - 99.0 FL    MCH 32.5 26.0 - 34.0 PG    MCHC 33.6 30.0 - 36.5 g/dL    RDW 12.2 11.5 - 14.5 %    PLATELET 936 878 - 536 K/uL    MPV 10.5 8.9 - 12.9 FL    NRBC 0.0 0  WBC    ABSOLUTE NRBC 0.00 0.00 - 0.01 K/uL    NEUTROPHILS 71 32 - 75 %    LYMPHOCYTES 22 12 - 49 %    MONOCYTES 5 5 - 13 %    EOSINOPHILS 2 0 - 7 %    BASOPHILS 0 0 - 1 %    IMMATURE GRANULOCYTES 0 0.0 - 0.5 %    ABS. NEUTROPHILS 6.1 1.8 - 8.0 K/UL    ABS. LYMPHOCYTES 1.9 0.8 - 3.5 K/UL    ABS. MONOCYTES 0.5 0.0 - 1.0 K/UL    ABS. EOSINOPHILS 0.2 0.0 - 0.4 K/UL    ABS. BASOPHILS 0.0 0.0 - 0.1 K/UL    ABS. IMM.  GRANS. 0.0 0.00 - 0.04 K/UL    DF AUTOMATED     METABOLIC PANEL, COMPREHENSIVE    Collection Time: 11/27/20  1:56 AM   Result Value Ref Range    Sodium 134 (L) 136 - 145 mmol/L    Potassium 3.2 (L) 3.5 - 5.1 mmol/L    Chloride 102 97 - 108 mmol/L    CO2 23 21 - 32 mmol/L    Anion gap 9 5 - 15 mmol/L    Glucose 147 (H) 65 - 100 mg/dL    BUN 18 6 - 20 MG/DL    Creatinine 0.87 0.55 - 1.02 MG/DL    BUN/Creatinine ratio 21 (H) 12 - 20      GFR est AA >60 >60 ml/min/1.73m2    GFR est non-AA >60 >60 ml/min/1.73m2    Calcium 8.3 (L) 8.5 - 10.1 MG/DL    Bilirubin, total 0.4 0.2 - 1.0 MG/DL    ALT (SGPT) 20 12 - 78 U/L    AST (SGOT) 31 15 - 37 U/L    Alk.  phosphatase 60 45 - 117 U/L    Protein, total 7.3 6.4 - 8.2 g/dL    Albumin 3.5 3.5 - 5.0 g/dL    Globulin 3.8 2.0 - 4.0 g/dL    A-G Ratio 0.9 (L) 1.1 - 2.2     MAGNESIUM    Collection Time: 11/27/20  1:56 AM   Result Value Ref Range    Magnesium 1.7 1.6 - 2.4 mg/dL   PHOSPHORUS    Collection Time: 11/27/20  1:56 AM   Result Value Ref Range    Phosphorus 2.4 (L) 2.6 - 4.7 MG/DL   TROPONIN I    Collection Time: 11/27/20  1:56 AM   Result Value Ref Range    Troponin-I, Qt. <0.05 <0.05 ng/mL

## 2020-11-27 NOTE — PROGRESS NOTES
The patient's son Yvette Lucio was called to complete the MRI checklist. He was a poor historian and could not answer the questions with certainty. A call was made to the patient's daughter Moe Hernandez but there was no answer. MRI has been informed of this delay. RN will continue to try to contact the patient's daughter.

## 2020-11-27 NOTE — PROGRESS NOTES
Hospitalist Progress Note    NAME: Phil Hogue   :  1923   MRN:  [id number]     Spoke at length with patient's son Aime Dwyer at [phone]. Updated plan of care all questions answered at this time. Son states he wants the patient to remain a full code. Assessment / Plan:  Intractable Seizure ( witnessed by her son and in ED)   Concern for PRESS vs seizure   She had a seizure about ten years ago but per son she did not want to take the medication that was prescribed. Son states she has not been taking her medications the past three days    Appreciate Neurology input    Responded to pain this am    CT of head 2020: IMPRESSION:   Moderate chronic small vessel ischemic disease. No acute intracranial  Abnormality     MRI pending      EEG scheduled for today     Speech consult     Continue IV keppra      Continue aspirin  Hypertension    Home med is lotrel 5-10 mg daily has not taken in past 3 days per son     Hydralazine as needed      Lethargic unable to swallow at this time oral meds on hold  Hypokalemia POA       D5 0.9% NS with 20 KCL at 50 ml/hr    Per patient's son she uses a quad cane and shuffles to ambulate, she has been talking and ambulating up until early this am.    Aime Dwyer (son) [phone]      less than 18.5 Underweight / Body mass index is 17.32 kg/m². Code status: Full  Prophylaxis: Lovenox  Recommended Disposition:  PT, OT, RN     Subjective:     Chief Complaint / Reason for Physician Visit  Responding to painful stimuli. Lethargic, no signs of distress noted. .  Discussed with RN events overnight.      Review of Systems:  Symptom Y/N Comments  Symptom Y/N Comments   Fever/Chills n   Chest Pain     Poor Appetite y   Edema n    Cough n   Abdominal Pain n    Sputum n   Joint Pain     SOB/VAUGAHN n   Pruritis/Rash n    Nausea/vomit n   Tolerating PT/OT     Diarrhea n   Tolerating Diet  NPO   Constipation n   Other       Could NOT obtain due to:      Objective: VITALS:   Last 24hrs VS reviewed since prior progress note. Most recent are:  Patient Vitals for the past 24 hrs:   Temp Pulse Resp BP SpO2   11/27/20 1604 98.4 °F (36.9 °C) 78 18 (!) 110/55 97 %   11/27/20 1235 98.4 °F (36.9 °C) 74 20 (!) 100/57 97 %   11/27/20 1025 98 °F (36.7 °C) 78 16 (!) 112/49 97 %   11/27/20 0829 97.8 °F (36.6 °C) 72 16 (!) 170/79 98 %   11/27/20 0706 97.8 °F (36.6 °C) 88 18 (!) 172/73 97 %   11/27/20 0600  89 18 (!) 150/105 94 %   11/27/20 0345  88 18 (!) 172/83 98 %   11/27/20 0230  90 18 (!) 117/38 95 %   11/27/20 0215   18     11/27/20 0200    (!) 150/93 96 %   11/27/20 0145     96 %   11/27/20 0143 98.4 °F (36.9 °C) (!) 104 20 (!) 189/120 97 %     No intake or output data in the 24 hours ending 11/27/20 1748     I had a face to face encounter and independently examined this patient on 11/27/2020, as outlined below:  PHYSICAL EXAM:  General:  Alert, cooperative, no acute distress, thin   EENT:   Anicteric sclerae. noromocephalic  Resp:  Diminished bases, no wheezing or rales. No accessory muscle use  CV:  Regular  rhythm,  No edema  GI:  Soft, Non distended, Non tender. +Bowel sounds  Neurologic:  Lethargic, non-verbal at this time   Psych:   Poor insight. Not anxious nor agitated  Skin:  No rashes. No jaundice    Reviewed most current lab test results and cultures  YES  Reviewed most current radiology test results   YES  Review and summation of old records today    NO  Reviewed patient's current orders and MAR    YES  PMH/SH reviewed - no change compared to H&P  ________________________________________________________________________  Care Plan discussed with:    Comments   Patient y    Family  y Son Clarisa Hodge RN y    Care Manager     Consultant                        Multidiciplinary team rounds were held today with , nursing, pharmacist and clinical coordinator.   Patient's plan of care was discussed; medications were reviewed and discharge planning was addressed. ________________________________________________________________________    ________________________________________________________________________  Rashid Cyr NP     Procedures: see electronic medical records for all procedures/Xrays and details which were not copied into this note but were reviewed prior to creation of Plan. LABS:  I reviewed today's most current labs and imaging studies.   Pertinent labs include:  Recent Labs     11/27/20 0156   WBC 8.6   HGB 13.0   HCT 38.7        Recent Labs     11/27/20 0156   *   K 3.2*      CO2 23   *   BUN 18   CREA 0.87   CA 8.3*   MG 1.7   PHOS 2.4*   ALB 3.5   TBILI 0.4   ALT 20       Signed: Rashid Cyr NP

## 2020-11-27 NOTE — PROGRESS NOTES
Occupational Therapy note:    Order acknowledged, chart reviewed, and spoke with nursing. Per RN, patient is not following commands and not appropriate for OT consult at this time. Will defer and continue to follow.     Lenny Mendieta OTR/L

## 2020-11-27 NOTE — ED PROVIDER NOTES
EMERGENCY DEPARTMENT HISTORY AND PHYSICAL EXAM      Date: 11/27/2020  Patient Name: Lian Alaniz    History of Presenting Illness     Chief Complaint   Patient presents with    Seizure     Pt arrived via EMS, reported son hear pt moaning and breathing heavy. Pt was unresponisve. History Provided By: Patient    HPI: Lian Alaniz, 80 y.o. female  With past medical history of seizure disorder on Keppra presenting today with altered mental status. Apparently her son found her unresponsive after hearing her moaning from a different room. He called EMS. When EMS arrived she seemed confused, would not answer questions easily. She is unable provide any further history given her altered mental status. She did have a brief seizure when she arrived in the emergency department. No significant abnormal vital signs. There are no other complaints, changes, or physical findings at this time. PCP: Lyn Oro MD    No current facility-administered medications on file prior to encounter. Current Outpatient Medications on File Prior to Encounter   Medication Sig Dispense Refill    acetaminophen (TYLENOL) 500 mg capsule Take 500 mg by mouth Before breakfast, lunch, dinner and at bedtime. Patient takes 1000 mg QAM, 500 mg TIDCC, and 500 mg QHS      acetaminophen (TYLENOL) 500 mg capsule Take 1,000 mg by mouth daily. Patient takes 1000 mg QAM, 500 mg TIDCC, and 500 mg QHS      omega 3-dha-epa-fish oil (FISH OIL) 100-160-1,000 mg cap Take 1 Cap by mouth daily.  aspirin delayed-release 81 mg tablet Take 81 mg by mouth daily.  amLODIPine-benazepril (LOTREL) 5-10 mg per capsule Take 1 Cap by mouth daily.  Walker misc 1 Units by Does Not Apply route as needed (unsteady gait). 1 Each 1    calcium-cholecalciferol, D3, (CALTRATE 600+D) tablet Take 1 Tab by mouth daily.  vitamin A-D-A-lutein-minerals (OCUVITE) tablet Take 1 Tab by mouth daily.       multivitamin (ONE A DAY) tablet Take 1 Tab by mouth daily.  meclizine (ANTIVERT) 25 mg tablet Take 1 Tab by mouth three (3) times daily as needed for Dizziness. 20 Tab 0    [DISCONTINUED] methocarbamol (ROBAXIN) 500 mg tablet Take 1 Tab by mouth four (4) times daily as needed for Pain. 20 Tab 0       Past History     Past Medical History:  Past Medical History:   Diagnosis Date    Hypertension     Seizure ([address]     Seizures ([address]        Past Surgical History:  Past Surgical History:   Procedure Laterality Date    HX ORTHOPAEDIC      hip, shoulder, elbow       Family History:  History reviewed. No pertinent family history. Social History:  Social History     Tobacco Use    Smoking status: Never Smoker    Smokeless tobacco: Never Used   Substance Use Topics    Alcohol use: Not Currently    Drug use: Never       Allergies: Allergies   Allergen Reactions    Penicillins Anaphylaxis         Review of Systems   Unable to obtain a full review of systems due to altered mental status      Physical Exam     Patient Vitals for the past 12 hrs:   Temp Pulse Resp BP SpO2   11/27/20 0600  89 18 (!) 150/105 94 %   11/27/20 0345  88 18 (!) 172/83 98 %   11/27/20 0230  90 18 (!) 117/38 95 %   11/27/20 0215   18     11/27/20 0200    (!) 150/93 96 %   11/27/20 0145     96 %   11/27/20 0143 98.4 °F (36.9 °C) (!) 104 20 (!) 189/120 97 %     General: lethargic, No acute distress  Eyes: EOMI, normal conjunctiva  Head: temporal wasting, no evidence of trauma  ENT: dry mucous membranes. Neck: Active, full ROM of neck. Skin: No rashes. no jaundice              Lungs: Equal chest expansion. no respiratory distress. clear to auscultation bilaterally No accessory muscle usage  Heart: tachycardic with a  regular rhythm     no peripheral edema   2+ radial pulses and DPs bilaterally  Abd:  non distended soft, nontender. No rebound tenderness. No guarding  Back: Full ROM  MSK: Full, active ROM in all 4 extremities.    Neuro: Alert and oriented to Person, Place, Time and Situation; normal speech;   Psych: Cooperative with exam; Appropriate mood and affect             Diagnostic Study Results     Labs -     Recent Results (from the past 12 hour(s))   EKG, 12 LEAD, INITIAL    Collection Time: 11/27/20  1:53 AM   Result Value Ref Range    Ventricular Rate 100 BPM    Atrial Rate 100 BPM    P-R Interval 216 ms    QRS Duration 88 ms    Q-T Interval 358 ms    QTC Calculation (Bezet) 461 ms    Calculated P Axis 61 degrees    Calculated R Axis -39 degrees    Calculated T Axis 47 degrees    Diagnosis       Sinus rhythm with 1st degree AV block  Possible Left atrial enlargement  Left axis deviation  Nonspecific ST and T wave abnormality  When compared with ECG of 16-DEC-2019 04:38,  IL interval has increased     CBC WITH AUTOMATED DIFF    Collection Time: 11/27/20  1:56 AM   Result Value Ref Range    WBC 8.6 3.6 - 11.0 K/uL    RBC 4.00 3.80 - 5.20 M/uL    HGB 13.0 11.5 - 16.0 g/dL    HCT 38.7 35.0 - 47.0 %    MCV 96.8 80.0 - 99.0 FL    MCH 32.5 26.0 - 34.0 PG    MCHC 33.6 30.0 - 36.5 g/dL    RDW 12.2 11.5 - 14.5 %    PLATELET 690 382 - 294 K/uL    MPV 10.5 8.9 - 12.9 FL    NRBC 0.0 0  WBC    ABSOLUTE NRBC 0.00 0.00 - 0.01 K/uL    NEUTROPHILS 71 32 - 75 %    LYMPHOCYTES 22 12 - 49 %    MONOCYTES 5 5 - 13 %    EOSINOPHILS 2 0 - 7 %    BASOPHILS 0 0 - 1 %    IMMATURE GRANULOCYTES 0 0.0 - 0.5 %    ABS. NEUTROPHILS 6.1 1.8 - 8.0 K/UL    ABS. LYMPHOCYTES 1.9 0.8 - 3.5 K/UL    ABS. MONOCYTES 0.5 0.0 - 1.0 K/UL    ABS. EOSINOPHILS 0.2 0.0 - 0.4 K/UL    ABS. BASOPHILS 0.0 0.0 - 0.1 K/UL    ABS. IMM.  GRANS. 0.0 0.00 - 0.04 K/UL    DF AUTOMATED     METABOLIC PANEL, COMPREHENSIVE    Collection Time: 11/27/20  1:56 AM   Result Value Ref Range    Sodium 134 (L) 136 - 145 mmol/L    Potassium 3.2 (L) 3.5 - 5.1 mmol/L    Chloride 102 97 - 108 mmol/L    CO2 23 21 - 32 mmol/L    Anion gap 9 5 - 15 mmol/L    Glucose 147 (H) 65 - 100 mg/dL    BUN 18 6 - 20 MG/DL    Creatinine 0.87 0.55 - 1.02 MG/DL    BUN/Creatinine ratio 21 (H) 12 - 20      GFR est AA >60 >60 ml/min/1.73m2    GFR est non-AA >60 >60 ml/min/1.73m2    Calcium 8.3 (L) 8.5 - 10.1 MG/DL    Bilirubin, total 0.4 0.2 - 1.0 MG/DL    ALT (SGPT) 20 12 - 78 U/L    AST (SGOT) 31 15 - 37 U/L    Alk. phosphatase 60 45 - 117 U/L    Protein, total 7.3 6.4 - 8.2 g/dL    Albumin 3.5 3.5 - 5.0 g/dL    Globulin 3.8 2.0 - 4.0 g/dL    A-G Ratio 0.9 (L) 1.1 - 2.2     MAGNESIUM    Collection Time: 11/27/20  1:56 AM   Result Value Ref Range    Magnesium 1.7 1.6 - 2.4 mg/dL   PHOSPHORUS    Collection Time: 11/27/20  1:56 AM   Result Value Ref Range    Phosphorus 2.4 (L) 2.6 - 4.7 MG/DL   TROPONIN I    Collection Time: 11/27/20  1:56 AM   Result Value Ref Range    Troponin-I, Qt. <0.05 <0.05 ng/mL       Radiologic Studies -   CT HEAD WO CONT   Final Result   IMPRESSION:    Moderate chronic small vessel ischemic disease. No acute intracranial   abnormality            XR CHEST PORT   Final Result   IMPRESSION: No acute cardiopulmonary process      MRI BRAIN W WO CONT    (Results Pending)     CT Results  (Last 48 hours)               11/27/20 0333  CT HEAD WO CONT Final result    Impression:  IMPRESSION:    Moderate chronic small vessel ischemic disease. No acute intracranial   abnormality               Narrative:  EXAM: CT HEAD WO CONT       INDICATION: seizure       COMPARISON: 12/16/2019. CONTRAST: None. TECHNIQUE: Unenhanced CT of the head was performed using 5 mm images. Brain and   bone windows were generated. Coronal and sagittal reformats. CT dose reduction   was achieved through use of a standardized protocol tailored for this   examination and automatic exposure control for dose modulation. FINDINGS:   The ventricles and sulci are normal in size, shape and configuration. . Moderate   subcortical deep white matter hypodensities. . There is no intracranial   hemorrhage, extra-axial collection, or mass effect.  The basilar cisterns are   open. No CT evidence of acute infarct. The bone windows demonstrate no abnormalities. The visualized portions of the   paranasal sinuses and mastoid air cells are clear. CXR Results  (Last 48 hours)               11/27/20 0250  XR CHEST PORT Final result    Impression:  IMPRESSION: No acute cardiopulmonary process       Narrative:  EXAM: XR CHEST PORT       INDICATION: AMS       COMPARISON: 3/17/2015       FINDINGS: A portable AP radiograph of the chest was obtained at 240 hours. The   patient is on a cardiac monitor. The lungs are clear. The cardiac and   mediastinal contours and pulmonary vascularity are normal.  Left shoulder   arthroplasty. .                  Medical Decision Making   I am the first provider for this patient. I reviewed the vital signs, available nursing notes, past medical history, past surgical history, family history and social history. Records Reviewed: I reviewed the patient's most recent emergency department visit and January 2020 where she had staples removed from a head laceration repair. Patient typically on Keppra 500 mg twice daily for seizure disorder. Vital Signs-Reviewed the patient's vital signs. Patient Vitals for the past 12 hrs:   Temp Pulse Resp BP SpO2   11/27/20 0600  89 18 (!) 150/105 94 %   11/27/20 0345  88 18 (!) 172/83 98 %   11/27/20 0230  90 18 (!) 117/38 95 %   11/27/20 0215   18     11/27/20 0200    (!) 150/93 96 %   11/27/20 0145     96 %   11/27/20 0143 98.4 °F (36.9 °C) (!) 104 20 (!) 189/120 97 %       Provider Notes (Medical Decision Making):     Differential Diagnosis: Breakthrough seizure, subarachnoid hemorrhage, electrolyte derangement, infection, arrhythmia    Initial Plan: We will obtain head CT, laboratory studies, EKG.   The patient did have a brief seizure when she arrived in the emergency department with tonic contractures of the bilateral upper extremities, and rhythmic movement towards the right side.    ED Course:   Initial assessment performed. The patients presenting problems have been discussed, and they are in agreement with the care plan formulated and outlined with them. I have encouraged them to ask questions as they arise throughout their visit. ED Course as of Nov 27 0627 Fri Nov 27, 2020   0216 On my interpretation of the patient's EKG sinus rhythm at a rate of 100, QTC is 461, no ST elevation or depression.    [NW]   0440 On my interpretation of the patient's laboratory studies mildly low phosphorus and potassium, but otherwise unremarkable. [NW]   [id number] On my interpretation of the patient CT had no evidence of acute bleed, chest x-ray unremarkable    [NW]   0440 Patient presenting today with multiple breakthrough seizures. She said to here in the emergency department, but after receiving Ativan and Keppra has not had any further seizures. Not quite back to baseline mental status. Ultimately Other work-up unremarkable. [NW]      ED Course User Index  [NW] Malinda Campos MD       I, Martita Ross MD, am the attending of record for this patient encounter. Dispo: Discharged. The patient has been re-evaluated and is ready for discharge. Reviewed available results with patient. Counseled patient on diagnosis and care plan. Patient has expressed understanding, and all questions have been answered. Patient agrees with plan and agrees to follow up as recommended, or to return to the ED if their symptoms worsen. Discharge instructions have been provided and explained to the patient, along with reasons to return to the ED. PLAN:  Current Discharge Medication List        2. Follow-up Information    None       3. Return to ED if worse       Diagnosis     Clinical Impression:   1. Seizures ([address]    2.  Altered mental status, unspecified altered mental status type        Attestations:    Martita Ross MD    Please note that this dictation was completed with jayson Carmen computer voice recognition software. Quite often unanticipated grammatical, syntax, homophones, and other interpretive errors are inadvertently transcribed by the computer software. Please disregard these errors. Please excuse any errors that have escaped final proofreading. Thank you.

## 2020-11-28 ENCOUNTER — APPOINTMENT (OUTPATIENT)
Dept: MRI IMAGING | Age: 85
DRG: 101 | End: 2020-11-28
Attending: INTERNAL MEDICINE
Payer: MEDICARE

## 2020-11-28 LAB
ANION GAP SERPL CALC-SCNC: 5 MMOL/L (ref 5–15)
APPEARANCE UR: CLEAR
ATRIAL RATE: 100 BPM
BACTERIA URNS QL MICRO: NEGATIVE /HPF
BILIRUB UR QL: NEGATIVE
BUN SERPL-MCNC: 14 MG/DL (ref 6–20)
BUN/CREAT SERPL: 22 (ref 12–20)
CALCIUM SERPL-MCNC: 8.4 MG/DL (ref 8.5–10.1)
CALCULATED P AXIS, ECG09: 61 DEGREES
CALCULATED R AXIS, ECG10: -39 DEGREES
CALCULATED T AXIS, ECG11: 47 DEGREES
CHLORIDE SERPL-SCNC: 109 MMOL/L (ref 97–108)
CHOLEST SERPL-MCNC: 124 MG/DL
CO2 SERPL-SCNC: 25 MMOL/L (ref 21–32)
COLOR UR: ABNORMAL
CREAT SERPL-MCNC: 0.65 MG/DL (ref 0.55–1.02)
DIAGNOSIS, 93000: NORMAL
EPITH CASTS URNS QL MICRO: ABNORMAL /LPF
ERYTHROCYTE [DISTWIDTH] IN BLOOD BY AUTOMATED COUNT: 12.3 % (ref 11.5–14.5)
EST. AVERAGE GLUCOSE BLD GHB EST-MCNC: 100 MG/DL
GLUCOSE SERPL-MCNC: 94 MG/DL (ref 65–100)
GLUCOSE UR STRIP.AUTO-MCNC: NEGATIVE MG/DL
HBA1C MFR BLD: 5.1 % (ref 4–5.6)
HCT VFR BLD AUTO: 31.5 % (ref 35–47)
HDLC SERPL-MCNC: 52 MG/DL
HDLC SERPL: 2.4 {RATIO} (ref 0–5)
HGB BLD-MCNC: 10.4 G/DL (ref 11.5–16)
HGB UR QL STRIP: ABNORMAL
HYALINE CASTS URNS QL MICRO: ABNORMAL /LPF (ref 0–5)
KETONES UR QL STRIP.AUTO: NEGATIVE MG/DL
LDLC SERPL CALC-MCNC: 59.8 MG/DL (ref 0–100)
LEUKOCYTE ESTERASE UR QL STRIP.AUTO: NEGATIVE
LIPID PROFILE,FLP: NORMAL
MCH RBC QN AUTO: 32.1 PG (ref 26–34)
MCHC RBC AUTO-ENTMCNC: 33 G/DL (ref 30–36.5)
MCV RBC AUTO: 97.2 FL (ref 80–99)
NITRITE UR QL STRIP.AUTO: NEGATIVE
NRBC # BLD: 0 K/UL (ref 0–0.01)
NRBC BLD-RTO: 0 PER 100 WBC
P-R INTERVAL, ECG05: 216 MS
PH UR STRIP: 6 [PH] (ref 5–8)
PLATELET # BLD AUTO: 176 K/UL (ref 150–400)
PMV BLD AUTO: 10.5 FL (ref 8.9–12.9)
POTASSIUM SERPL-SCNC: 3.2 MMOL/L (ref 3.5–5.1)
PROT UR STRIP-MCNC: 100 MG/DL
Q-T INTERVAL, ECG07: 358 MS
QRS DURATION, ECG06: 88 MS
QTC CALCULATION (BEZET), ECG08: 461 MS
RBC # BLD AUTO: 3.24 M/UL (ref 3.8–5.2)
RBC #/AREA URNS HPF: ABNORMAL /HPF (ref 0–5)
SODIUM SERPL-SCNC: 139 MMOL/L (ref 136–145)
SP GR UR REFRACTOMETRY: 1.01 (ref 1–1.03)
TRIGL SERPL-MCNC: 61 MG/DL (ref ?–150)
UA: UC IF INDICATED,UAUC: ABNORMAL
UROBILINOGEN UR QL STRIP.AUTO: 0.2 EU/DL (ref 0.2–1)
VENTRICULAR RATE, ECG03: 100 BPM
VLDLC SERPL CALC-MCNC: 12.2 MG/DL
WBC # BLD AUTO: 6.9 K/UL (ref 3.6–11)
WBC URNS QL MICRO: ABNORMAL /HPF (ref 0–4)

## 2020-11-28 PROCEDURE — 70551 MRI BRAIN STEM W/O DYE: CPT

## 2020-11-28 PROCEDURE — 80048 BASIC METABOLIC PNL TOTAL CA: CPT

## 2020-11-28 PROCEDURE — 87086 URINE CULTURE/COLONY COUNT: CPT

## 2020-11-28 PROCEDURE — 80061 LIPID PANEL: CPT

## 2020-11-28 PROCEDURE — 85027 COMPLETE CBC AUTOMATED: CPT

## 2020-11-28 PROCEDURE — 74011250636 HC RX REV CODE- 250/636: Performed by: INTERNAL MEDICINE

## 2020-11-28 PROCEDURE — 74011250637 HC RX REV CODE- 250/637: Performed by: NURSE PRACTITIONER

## 2020-11-28 PROCEDURE — 36415 COLL VENOUS BLD VENIPUNCTURE: CPT

## 2020-11-28 PROCEDURE — 74011250637 HC RX REV CODE- 250/637: Performed by: INTERNAL MEDICINE

## 2020-11-28 PROCEDURE — 81001 URINALYSIS AUTO W/SCOPE: CPT

## 2020-11-28 PROCEDURE — 83036 HEMOGLOBIN GLYCOSYLATED A1C: CPT

## 2020-11-28 PROCEDURE — 74011250636 HC RX REV CODE- 250/636: Performed by: NURSE PRACTITIONER

## 2020-11-28 PROCEDURE — 65660000000 HC RM CCU STEPDOWN

## 2020-11-28 RX ORDER — HALOPERIDOL 5 MG/ML
2 INJECTION INTRAMUSCULAR ONCE
Status: COMPLETED | OUTPATIENT
Start: 2020-11-28 | End: 2020-11-28

## 2020-11-28 RX ORDER — LANOLIN ALCOHOL/MO/W.PET/CERES
3 CREAM (GRAM) TOPICAL
Status: DISCONTINUED | OUTPATIENT
Start: 2020-11-28 | End: 2020-12-03 | Stop reason: HOSPADM

## 2020-11-28 RX ADMIN — HEPARIN SODIUM 5000 UNITS: 5000 INJECTION INTRAVENOUS; SUBCUTANEOUS at 09:37

## 2020-11-28 RX ADMIN — HEPARIN SODIUM 5000 UNITS: 5000 INJECTION INTRAVENOUS; SUBCUTANEOUS at 22:08

## 2020-11-28 RX ADMIN — ASPIRIN 300 MG: 300 SUPPOSITORY RECTAL at 09:38

## 2020-11-28 RX ADMIN — Medication 3 MG: at 21:59

## 2020-11-28 RX ADMIN — LEVETIRACETAM 500 MG: 5 INJECTION INTRAVENOUS at 09:38

## 2020-11-28 RX ADMIN — HALOPERIDOL LACTATE 2 MG: 5 INJECTION, SOLUTION INTRAMUSCULAR at 10:19

## 2020-11-28 RX ADMIN — ATORVASTATIN CALCIUM 40 MG: 40 TABLET, FILM COATED ORAL at 21:59

## 2020-11-28 RX ADMIN — LEVETIRACETAM 500 MG: 5 INJECTION INTRAVENOUS at 22:08

## 2020-11-28 NOTE — PROGRESS NOTES
Problem: Pressure Injury - Risk of  Goal: *Prevention of pressure injury  Description: Document Danilo Scale and appropriate interventions in the flowsheet. Outcome: Progressing Towards Goal  Note: Pressure Injury Interventions:  Sensory Interventions: Assess changes in LOC, Float heels, Keep linens dry and wrinkle-free    Moisture Interventions: Absorbent underpads, Internal/External urinary devices    Activity Interventions: Assess need for specialty bed, Pressure redistribution bed/mattress(bed type)    Mobility Interventions: Assess need for specialty bed, HOB 30 degrees or less, Pressure redistribution bed/mattress (bed type)    Nutrition Interventions: Offer support with meals,snacks and hydration    Friction and Shear Interventions: Minimize layers                Problem: Patient Education: Go to Patient Education Activity  Goal: Patient/Family Education  Outcome: Progressing Towards Goal     Problem: Seizure Disorder (Adult)  Goal: *STG: Remains free of seizure activity  Outcome: Progressing Towards Goal  Goal: *STG: Maintains lab values within therapeutic range  Outcome: Progressing Towards Goal  Goal: *STG/LTG: Complies with medication therapy  Outcome: Progressing Towards Goal  Goal: *STG: Remains free of injury during seizure activity  Outcome: Progressing Towards Goal  Goal: *STG: Remains safe in hospital  Outcome: Progressing Towards Goal  Goal: Interventions  Outcome: Progressing Towards Goal     Problem: Patient Education: Go to Patient Education Activity  Goal: Patient/Family Education  Outcome: Progressing Towards Goal     Problem: Falls - Risk of  Goal: *Absence of Falls  Description: Document Lorri Fall Risk and appropriate interventions in the flowsheet.   Outcome: Progressing Towards Goal  Note: Fall Risk Interventions:       Mentation Interventions: Adequate sleep, hydration, pain control, Bed/chair exit alarm, Door open when patient unattended, Gait belt with transfers/ambulation, Increase mobility, More frequent rounding, Toileting rounds, Update white board    Medication Interventions: Bed/chair exit alarm, Patient to call before getting OOB, Teach patient to arise slowly, Utilize gait belt for transfers/ambulation    Elimination Interventions: Bed/chair exit alarm, Call light in reach, Patient to call for help with toileting needs, Toileting schedule/hourly rounds    History of Falls Interventions: Bed/chair exit alarm, Door open when patient unattended, Utilize gait belt for transfer/ambulation         Problem: Patient Education: Go to Patient Education Activity  Goal: Patient/Family Education  Outcome: Progressing Towards Goal

## 2020-11-28 NOTE — PROGRESS NOTES
End of Shift Note    Bedside shift change report given to Pipe Rascon (oncoming nurse) by Lourdes Walsh (offgoing nurse). Report included the following information SBAR, MAR and Recent Results    Shift worked:  [id number]   Shift summary and any significant changes:     haldol given- ordered q8hr for agitation        Concerns for physician to address:  none   Zone phone for oncoming shift:   [phone]     Patient Information  Joan Castañeda  80 y.o.  11/27/2020  1:30 AM by Carlos Ureña MD. Joan Castañeda was admitted from Home    Problem List  Patient Active Problem List    Diagnosis Date Noted    Intractable seizures ([facility] 11/27/2020    Hypokalemia 11/27/2020    HTN (hypertension) 11/27/2020    Acute encephalopathy 11/27/2020     Past Medical History:   Diagnosis Date    Hypertension     Seizure ([facility]     Seizures ([facility]        Core Measures:  CVA: No No  CHF:No No  PNA:No No    Activity:  Activity Level: Bed Rest  Number times ambulated in hallways past shift: 0  Number of times OOB to chair past shift: 0    Cardiac:   Cardiac Monitoring: Yes      Cardiac Rhythm: Normal sinus rhythm    Access:   Current line(s): PIV       Genitourinary:   Urinary status: incontinent       Respiratory:   O2 Device: Room air  Chronic home O2 use?: NO  Incentive spirometer at bedside: NO       GI:     Current diet:  DIET CLEAR LIQUID  DIET NUTRITIONAL SUPPLEMENTS Breakfast, Dinner; Ensure Clear  DIET NPO  Passing flatus: YES  Tolerating current diet: NPO       Pain Management:   Patient states pain is manageable on current regimen: N/A    Skin:  Danilo Score: 12  Interventions: limit briefs and internal/external urinary devices    Patient Safety:  Fall Score:  Total Score: 4  Interventions: bed/chair alarm, gripper socks, pt to call before getting OOB, stay with me (per policy) and gait belt  High Fall Risk: Yes    DVT prophylaxis:  DVT prophylaxis Med- Yes  DVT prophylaxis SCD or TERRY- No     Wounds: (If Applicable)  Wounds- No  Location     Active Consults:  IP CONSULT TO NEUROLOGY  IP CONSULT TO PALLIATIVE CARE - PROVIDER    Length of Stay:  Expected LOS: 2d 16h  Actual LOS: 1  Discharge Plan: No tbd      [facility]

## 2020-11-28 NOTE — PROGRESS NOTES
Hospitalist Progress Note    NAME: Nick Perry   :  1923   MRN:  [id number]     Attempted to call  daughter Mackenzie Pascal with no answer. Will attempt at a later time. Spoke at length with son Jovanny Bowles with updated plan of care. All questions answered at this time. Assessment / Plan:  Intractable Seizure (Witnessed by son and in the ED)   Possible PRESS vs Seizure   Her Son states she had a seizure about 10 years ago with similar symptoms. She was prescribed medication but had refused to take it. The past 3 days she has refused her blood pressure medications as well. She has not had any seizure activity up until now. Appreciate Neurology input   Patient is more alert this am, she has pulled out 4 IV's, confused, agitated, and yelling at staff. Haldol 2 mg IM given x one with improved agitation    CT of head 2020: IMPRESSION:   Moderate chronic small vessel ischemic disease. No acute intracranial  Abnormality     MRI pending completion of check list, waiting on family to complete, unable to reach  The daughter, son is a poor historian. EEG scheduled for today     Speech consult     Continue IV keppra      Continue aspirin      Speech Consult recommend NPO at this time      Continue IV hydration      Urinalysis unremarkable     Urine cultures pending   Hypertension POA      She is on lotrel 5-10 mg daily at home has not taken it the past few days prior to admission per her son. She is NPO at this time per Speech recommendation       Hydralazine IV as needed for blood pressure control  Hypokalemia     D5 0.9% NS with 20 KCL at 50 l/hr     Monitor     Magnesium 1.7    Per patient's son she uses a quad cane and shuffles to ambulate, she has been talking and ambulating up until early this am. Her son states she has been incontinent of urine at times.       Jovanny Bowles (son) [phone]      less than 18.5 Underweight / Body mass index is 17.32 kg/m².     Code status: Full  Prophylaxis: Heparin  Recommended Disposition: TBD     Subjective:     Chief Complaint / Reason for Physician Visit  Patient alert, agitated, confused, Per RN has pulled 4 IV's and is taking her clothes off and continues to pull at IV acces which is in the arm board. She is cursing at the staff and asking for her \"daddy\". Attempted to call son Donna Etienne and daughter with no answer. Will attempt at a later time. Discussed with RN events overnight. Review of Systems:  Symptom Y/N Comments  Symptom Y/N Comments   Fever/Chills n   Chest Pain     Poor Appetite  NPO  Edema n    Cough n   Abdominal Pain     Sputum n   Joint Pain     SOB/VAUGHAN n   Pruritis/Rash n    Nausea/vomit n   Tolerating PT/OT     Diarrhea n   Tolerating Diet     Constipation    Other       Could NOT obtain due to:      Objective:     VITALS:   Last 24hrs VS reviewed since prior progress note. Most recent are:  Patient Vitals for the past 24 hrs:   Temp Pulse Resp BP SpO2   11/28/20 1143 97.6 °F (36.4 °C) 88 20 (!) 156/58 98 %   11/28/20 0829 99.8 °F (37.7 °C) 84 16 (!) 149/74 98 %   11/28/20 0348 98.7 °F (37.1 °C) 83 14 (!) 119/49 95 %   11/27/20 2320 98.5 °F (36.9 °C) 86 14 (!) 122/55 96 %   11/27/20 1937 98.2 °F (36.8 °C) 81 18 (!) 144/67 95 %   11/27/20 1604 98.4 °F (36.9 °C) 78 18 (!) 110/55 97 %     No intake or output data in the 24 hours ending 11/28/20 1259     I had a face to face encounter and independently examined this patient on 11/28/2020, as outlined below:  PHYSICAL EXAM:  General: Alert, cooperative, no acute distress, confused, agitated, thin  EENT:   Anicteric sclerae. normocephalic  Resp:  Diminished bases, no wheezing or rales. No accessory muscle use  CV:  Regular  rhythm,  No edema  GI:  Soft, Non distended, Non tender. +Bowel sounds  Neurologic:  Alert and oriented to self only, normal speech,   Psych:   Poor insight. + agitation  Skin:  No rashes.   No jaundice    Reviewed most current lab test results and cultures  YES  Reviewed most current radiology test results   YES  Review and summation of old records today    NO  Reviewed patient's current orders and MAR    YES  PMH/SH reviewed - no change compared to H&P  ________________________________________________________________________  Care Plan discussed with:    Comments   Patient y    Family   Tried to call family with no answer   RN y    Care Manager     Consultant                        Multidiciplinary team rounds were held today with , nursing, pharmacist and clinical coordinator. Patient's plan of care was discussed; medications were reviewed and discharge planning was addressed. ________________________________________________________________________    ________________________________________________________________________  Jose Sanchez NP     Procedures: see electronic medical records for all procedures/Xrays and details which were not copied into this note but were reviewed prior to creation of Plan. LABS:  I reviewed today's most current labs and imaging studies.   Pertinent labs include:  Recent Labs     11/28/20 0225 11/27/20 0156   WBC 6.9 8.6   HGB 10.4* 13.0   HCT 31.5* 38.7    196     Recent Labs     11/28/20 0225 11/27/20 0156    134*   K 3.2* 3.2*   * 102   CO2 25 23   GLU 94 147*   BUN 14 18   CREA 0.65 0.87   CA 8.4* 8.3*   MG  --  1.7   PHOS  --  2.4*   ALB  --  3.5   TBILI  --  0.4   ALT  --  20       Signed: Jose Sanchez NP

## 2020-11-28 NOTE — PROGRESS NOTES
Occupational Therapy Note  Orders received, chart reviewed, spoke to RN. Pt currently with verbal outbursts, cursing, oriented to one, and not following any commands. Will hold OT and f/u tomorrow as able and if pt appropriate for ADL assessment. Rn aware and in agreement. thank you.     Eliot Viveros, OTR/L

## 2020-11-28 NOTE — PROGRESS NOTES
Bedside and Verbal shift change report given to Enrique Palumbo (oncoming nurse) by Barney Koehler (offgoing nurse). Report included the following information SBAR, Kardex, MAR and Recent Results.

## 2020-11-29 LAB
ALBUMIN SERPL-MCNC: 3 G/DL (ref 3.5–5)
ALBUMIN/GLOB SERPL: 0.9 {RATIO} (ref 1.1–2.2)
ALP SERPL-CCNC: 60 U/L (ref 45–117)
ALT SERPL-CCNC: 18 U/L (ref 12–78)
ANION GAP SERPL CALC-SCNC: 7 MMOL/L (ref 5–15)
AST SERPL-CCNC: 33 U/L (ref 15–37)
BACTERIA SPEC CULT: NORMAL
BILIRUB SERPL-MCNC: 0.6 MG/DL (ref 0.2–1)
BUN SERPL-MCNC: 12 MG/DL (ref 6–20)
BUN/CREAT SERPL: 21 (ref 12–20)
CALCIUM SERPL-MCNC: 9.2 MG/DL (ref 8.5–10.1)
CHLORIDE SERPL-SCNC: 111 MMOL/L (ref 97–108)
CO2 SERPL-SCNC: 24 MMOL/L (ref 21–32)
CREAT SERPL-MCNC: 0.56 MG/DL (ref 0.55–1.02)
ERYTHROCYTE [DISTWIDTH] IN BLOOD BY AUTOMATED COUNT: 12.3 % (ref 11.5–14.5)
GLOBULIN SER CALC-MCNC: 3.2 G/DL (ref 2–4)
GLUCOSE SERPL-MCNC: 81 MG/DL (ref 65–100)
HCT VFR BLD AUTO: 34.7 % (ref 35–47)
HGB BLD-MCNC: 11.5 G/DL (ref 11.5–16)
MAGNESIUM SERPL-MCNC: 1.5 MG/DL (ref 1.6–2.4)
MCH RBC QN AUTO: 32.5 PG (ref 26–34)
MCHC RBC AUTO-ENTMCNC: 33.1 G/DL (ref 30–36.5)
MCV RBC AUTO: 98 FL (ref 80–99)
NRBC # BLD: 0 K/UL (ref 0–0.01)
NRBC BLD-RTO: 0 PER 100 WBC
PLATELET # BLD AUTO: 167 K/UL (ref 150–400)
PMV BLD AUTO: 10.6 FL (ref 8.9–12.9)
POTASSIUM SERPL-SCNC: 3.3 MMOL/L (ref 3.5–5.1)
PROT SERPL-MCNC: 6.2 G/DL (ref 6.4–8.2)
RBC # BLD AUTO: 3.54 M/UL (ref 3.8–5.2)
SERVICE CMNT-IMP: NORMAL
SODIUM SERPL-SCNC: 142 MMOL/L (ref 136–145)
WBC # BLD AUTO: 7.8 K/UL (ref 3.6–11)

## 2020-11-29 PROCEDURE — 36415 COLL VENOUS BLD VENIPUNCTURE: CPT

## 2020-11-29 PROCEDURE — 85027 COMPLETE CBC AUTOMATED: CPT

## 2020-11-29 PROCEDURE — 65660000000 HC RM CCU STEPDOWN

## 2020-11-29 PROCEDURE — 80053 COMPREHEN METABOLIC PANEL: CPT

## 2020-11-29 PROCEDURE — 74011250637 HC RX REV CODE- 250/637: Performed by: INTERNAL MEDICINE

## 2020-11-29 PROCEDURE — 74011250637 HC RX REV CODE- 250/637: Performed by: NURSE PRACTITIONER

## 2020-11-29 PROCEDURE — 74011250636 HC RX REV CODE- 250/636: Performed by: INTERNAL MEDICINE

## 2020-11-29 PROCEDURE — 99233 SBSQ HOSP IP/OBS HIGH 50: CPT | Performed by: PSYCHIATRY & NEUROLOGY

## 2020-11-29 PROCEDURE — 74011250636 HC RX REV CODE- 250/636: Performed by: NURSE PRACTITIONER

## 2020-11-29 PROCEDURE — 83735 ASSAY OF MAGNESIUM: CPT

## 2020-11-29 RX ORDER — AMLODIPINE BESYLATE 5 MG/1
5 TABLET ORAL DAILY
Status: DISCONTINUED | OUTPATIENT
Start: 2020-11-30 | End: 2020-12-01

## 2020-11-29 RX ORDER — HALOPERIDOL 5 MG/ML
1 INJECTION INTRAMUSCULAR
Status: DISCONTINUED | OUTPATIENT
Start: 2020-11-29 | End: 2020-12-03 | Stop reason: HOSPADM

## 2020-11-29 RX ORDER — MAGNESIUM SULFATE HEPTAHYDRATE 40 MG/ML
2 INJECTION, SOLUTION INTRAVENOUS ONCE
Status: COMPLETED | OUTPATIENT
Start: 2020-11-29 | End: 2020-11-29

## 2020-11-29 RX ORDER — POTASSIUM CHLORIDE, DEXTROSE MONOHYDRATE AND SODIUM CHLORIDE 300; 5; 900 MG/100ML; G/100ML; MG/100ML
INJECTION, SOLUTION INTRAVENOUS CONTINUOUS
Status: DISCONTINUED | OUTPATIENT
Start: 2020-11-29 | End: 2020-12-01

## 2020-11-29 RX ADMIN — LORAZEPAM 0.5 MG: 2 INJECTION INTRAMUSCULAR; INTRAVENOUS at 14:59

## 2020-11-29 RX ADMIN — HEPARIN SODIUM 5000 UNITS: 5000 INJECTION INTRAVENOUS; SUBCUTANEOUS at 21:05

## 2020-11-29 RX ADMIN — MAGNESIUM SULFATE HEPTAHYDRATE 2 G: 40 INJECTION, SOLUTION INTRAVENOUS at 09:15

## 2020-11-29 RX ADMIN — LEVETIRACETAM 500 MG: 5 INJECTION INTRAVENOUS at 09:17

## 2020-11-29 RX ADMIN — HEPARIN SODIUM 5000 UNITS: 5000 INJECTION INTRAVENOUS; SUBCUTANEOUS at 09:14

## 2020-11-29 RX ADMIN — Medication 3 MG: at 21:05

## 2020-11-29 RX ADMIN — LORAZEPAM 0.5 MG: 2 INJECTION INTRAMUSCULAR; INTRAVENOUS at 08:00

## 2020-11-29 RX ADMIN — ASPIRIN 300 MG: 300 SUPPOSITORY RECTAL at 09:14

## 2020-11-29 RX ADMIN — ATORVASTATIN CALCIUM 40 MG: 40 TABLET, FILM COATED ORAL at 21:06

## 2020-11-29 RX ADMIN — POTASSIUM CHLORIDE, DEXTROSE MONOHYDRATE AND SODIUM CHLORIDE: 300; 5; 900 INJECTION, SOLUTION INTRAVENOUS at 09:15

## 2020-11-29 RX ADMIN — LEVETIRACETAM 500 MG: 5 INJECTION INTRAVENOUS at 21:06

## 2020-11-29 NOTE — PROGRESS NOTES
Pt tolerated PO meds, no swallowing difficulties no pocketing    Meds crushed in apple sauce or ice cream    Pt tolerated jello from her meal tray

## 2020-11-29 NOTE — PROGRESS NOTES
Chief Complaint: Seizures    66-year-old female admitted with recurrent seizures found to be hypertensive. Since admission she has been confused    Assesment and Plan  1. Seizures ([facility] [address]  None since admission  Continue keppra  MRI shows cerebral atrophy    2. Altered mental status, unspecified altered mental status type  hypertensive vs delirium    3.  Hypertension  Under better control         Allergies  Penicillins     Medications  Current Facility-Administered Medications   Medication Dose Route Frequency    dextrose 5% - 0.9% NaCl with KCl 40 mEq/L infusion   IntraVENous CONTINUOUS    haloperidol lactate (HALDOL) injection 1 mg  1 mg IntraMUSCular Q8H PRN    melatonin tablet 3 mg  3 mg Oral QHS    acetaminophen (TYLENOL) tablet 650 mg  650 mg Oral Q4H PRN    Or    acetaminophen (TYLENOL) solution 650 mg  650 mg Per NG tube Q4H PRN    Or    acetaminophen (TYLENOL) suppository 650 mg  650 mg Rectal Q4H PRN    atorvastatin (LIPITOR) tablet 40 mg  40 mg Oral QHS    hydrALAZINE (APRESOLINE) 20 mg/mL injection 5 mg  5 mg IntraVENous Q6H PRN    levETIRAcetam (KEPPRA) 500 mg in saline (iso-osm) 100 mL IVPB (premix)  500 mg IntraVENous Q12H    LORazepam (ATIVAN) injection 0.5 mg  0.5 mg IntraVENous Q2H PRN    aspirin (ASA) suppository 300 mg  300 mg Rectal DAILY    heparin (porcine) injection 5,000 Units  5,000 Units SubCUTAneous Q12H        Medical History  Past Medical History:   Diagnosis Date    Hypertension     Seizure ([facility] [address]     Seizures ([facility] [address]      Review of Systems   Unable to perform ROS: Mental acuity       Exam:    Visit Vitals  BP (!) 158/82   Pulse 82   Temp 97.7 °F (36.5 °C)   Resp 18   Ht 4' 10\" (1.473 m)   Wt 82 lb 14.3 oz (37.6 kg)   SpO2 97%   BMI 17.32 kg/m²        General:  Laying in bed in the disheveled state   Head: Normocephalic, atraumatic, anicteric sclera   Neck Normal ROM, No thyromegally   Cardiac: Regular rate and rhythm   Ext: No pedal edema   Skin: Supple no rash NeurologicExam:  Mental Status:  Awake oriented to self   Speech:  Did not speak to me   Cranial Nerves:  II - XII Intact   Motor:  Full and symmetric strength of upper and lower ext. Normal bulk and tone. Sensory:   Symmetric and intact    Gait:   Deferred   Tremor:   No tremor noted. Cerebellar:  Coordination intact.        Lab Review  Lab Results   Component Value Date/Time    WBC 7.8 11/29/2020 02:23 AM    HCT 34.7 (L) 11/29/2020 02:23 AM    HGB 11.5 11/29/2020 02:23 AM    PLATELET 267 33/51/1094 02:23 AM       Lab Results   Component Value Date/Time    Sodium 142 11/29/2020 02:23 AM    Potassium 3.3 (L) 11/29/2020 02:23 AM    Chloride 111 (H) 11/29/2020 02:23 AM    CO2 24 11/29/2020 02:23 AM    Glucose 81 11/29/2020 02:23 AM    BUN 12 11/29/2020 02:23 AM    Creatinine 0.56 11/29/2020 02:23 AM    Calcium 9.2 11/29/2020 02:23 AM       Lab Results   Component Value Date/Time    Hemoglobin A1c 5.1 11/28/2020 02:25 AM        Lab Results   Component Value Date/Time    Cholesterol, total 124 11/28/2020 02:25 AM    HDL Cholesterol 52 11/28/2020 02:25 AM    LDL, calculated 59.8 11/28/2020 02:25 AM    VLDL, calculated 12.2 11/28/2020 02:25 AM    Triglyceride 61 11/28/2020 02:25 AM    CHOL/HDL Ratio 2.4 11/28/2020 02:25 AM

## 2020-11-29 NOTE — PROGRESS NOTES
Physical Therapy  Chart reviewed. Spoke with RN who is reporting that patient is combative and agitated. Patient not currently appropriate for PT. Will defer but continue to follow.

## 2020-11-29 NOTE — PROGRESS NOTES
Hospitalist Progress Note    NAME: Neyda Ruiz   :  1923   MRN:  [id number]     I reviewed pertinent labs and imaging, and discussed /agreed on the plan of care with Dr. Erich Apgar.      Assessment / Plan:  Intractable Seizure (Witnessed by her son and in the ED) (Resolved)/Confusion POA   Possible PRESS vs seizure   Her Son states she had a seizure about 10 years ago with similar symptoms. She was prescribed medication but had refused to take it. The past 3 days she has refused her blood pressure medications as well. She has not had any seizure activity up until now. Appreciate Neurology input  Per RN she was agitated/confused  this am and was given ativan IV  At time of exam patient was resting comfortably without signs/symptoms of distress.   CT of head 2020: IMPRESSION:   Moderate chronic small vessel ischemic disease. No acute intracranial  Abnormality  MRI 2020: IMPRESSION:  1. Sequela of cerebral atrophy and chronic microvascular ischemic disease but  no evidence of mass, edema, or hemorrhage. 2.  No acute infarct. Patient tolerating dysphagia diet without difficult/Assist with meals  Appreciate Neurology input  Speech input appreciated  Urinalysis unremarkable  Urine culture negative   Hypertension POA   Continue Norvasc 5 mg daily   Hydralazine as needed   Improving    Monitor  Hypokalemia   D5 0.9% NS with 40 KCL at 50 ml/hr   Monitor  Weakness/Frequent Falls POA   PT/OT evaluation     PCP: Delaney Martinez MD    Per patient's son she uses a quad cane and shuffles to ambulate, she has been talking and ambulating up until early this am. Her son states she has been incontinent of urine at times.     Per patient's son the patient has had frequent falls at home recently. Read Mo (son) [phone]    less than 18.5 Underweight / Body mass index is 17.32 kg/m².     Code status: Full  Prophylaxis: Hep SQ  Recommended Disposition: TBD     Subjective:     Chief Complaint / Reason for Physician Visit  Per RN patient was agitated this morning. On exam patient was resting comfortably without sign/symptoms of distress. She was give a dose of low dose ativan. .  Discussed with RN events overnight. Review of Systems:  Symptom Y/N Comments  Symptom Y/N Comments   Fever/Chills n   Chest Pain n    Poor Appetite n   Edema n    Cough n   Abdominal Pain     Sputum n   Joint Pain     SOB/VAUGHAN n   Pruritis/Rash     Nausea/vomit n   Tolerating PT/OT     Diarrhea n   Tolerating Diet y    Constipation n   Other       Could NOT obtain due to:      Objective:     VITALS:   Last 24hrs VS reviewed since prior progress note. Most recent are:  Patient Vitals for the past 24 hrs:   Temp Pulse Resp BP SpO2   11/29/20 1137 97.7 °F (36.5 °C) 82 18 (!) 158/82 97 %   11/29/20 0925 97.8 °F (36.6 °C) 69 17 (!) 122/52 93 %   11/29/20 0328 97.8 °F (36.6 °C) 74 18 (!) 140/62 96 %   11/29/20 0021 97.6 °F (36.4 °C) 76 18 (!) 143/60 95 %   11/28/20 1537 97.9 °F (36.6 °C) 86 20 (!) 148/66 98 %     No intake or output data in the 24 hours ending 11/29/20 1314     I had a face to face encounter and independently examined this patient on 11/29/2020, as outlined below:  PHYSICAL EXAM:  General:  Alert, cooperative, no acute distress, cachexic   EENT:  Anicteric sclerae. Normocephalic  Resp:  Diminished bases, no wheezing or rales. No accessory muscle use  CV:  Regular  rhythm,  No edema  GI:  Soft, Non distended, Non tender. +Bowel sounds  Neurologic:  Alert and oriented to self, normal speech,   Psych:   Poor insight. Not anxious nor agitated  Skin:  No rashes.   No jaundice    Reviewed most current lab test results and cultures  YES  Reviewed most current radiology test results   YES  Review and summation of old records today    NO  Reviewed patient's current orders and MAR    YES  PMH/SH reviewed - no change compared to H&P  ________________________________________________________________________  Care Plan discussed with: Comments   Patient y    Family  y    RN y    Care Manager     Consultant                        Multidiciplinary team rounds were held today with , nursing, pharmacist and clinical coordinator. Patient's plan of care was discussed; medications were reviewed and discharge planning was addressed. ________________________________________________________________________    ________________________________________________________________________  Rosalie Elizabeth NP     Procedures: see electronic medical records for all procedures/Xrays and details which were not copied into this note but were reviewed prior to creation of Plan. LABS:  I reviewed today's most current labs and imaging studies.   Pertinent labs include:  Recent Labs     11/29/20 0223 11/28/20 0225 11/27/20  0156   WBC 7.8 6.9 8.6   HGB 11.5 10.4* 13.0   HCT 34.7* 31.5* 38.7    176 196     Recent Labs     11/29/20 0223 11/28/20 0225 11/27/20  0156    139 134*   K 3.3* 3.2* 3.2*   * 109* 102   CO2 24 25 23   GLU 81 94 147*   BUN 12 14 18   CREA 0.56 0.65 0.87   CA 9.2 8.4* 8.3*   MG 1.5*  --  1.7   PHOS  --   --  2.4*   ALB 3.0*  --  3.5   TBILI 0.6  --  0.4   ALT 18  --  20       Signed: Rosalie Elizabeth NP

## 2020-11-29 NOTE — PROGRESS NOTES
Occupational Therapy  Chart reviewed, and discussed patient with nursing. She continues to be agitated and combative, and not appropriate for OT evaluation at this time. Will defer for now but continue to follow.

## 2020-11-29 NOTE — CONSULTS
Palliative Medicine Consult  Jone: [phone] ([phone])    Patient Name: Юлия Jansen  YOB: 1923    Date of Initial Consult: 11/27/2020  Reason for Consult: Goals of care discussion  Requesting Provider: Dr. Eva Zelaya  Primary Care Physician: Preethi, MD Lyn     SUMMARY:   Юлия Jansen is a 80 y.o. with a past history of HTN, seizures, who was admitted on 11/27/2020 from home with a diagnosis of intractable seizures. Current medical issues leading to Palliative Medicine involvement include: Goals of care discussion with family of 70-year-old woman currently hospitalized with intractable seizures and associated encephalopathy who has a full CODE STATUS. Patient presented to the ER with CC of family finding patient unresponsive after hearing her moaning from a different room. When EMS arrived patient confused. In ER patient had brief seizure upon arrival.  She was afebrile, tachycardic with pulse 104 and hypertensive emergency with /120. Patient admitted    Course of hospitalization: Patient has been in a postictal state. Neurology consulted EEG with generalized slowing  consistent with encephalopathy, however no focal asymmetry or seizure-like discharge. PALLIATIVE DIAGNOSES:   1. Advance care planning discussion  2. DNR discussion  3. Goals of care discussion  4. Altered mental status, unspecified       PLAN:   1. Prior to visit completed extensive chart review, including review of current hospitalization documentation, vitals, Mars and results of labs and other diagnostics. I also spoke with unit nurse. 2. I met with patient, no family at bedside. Patient was sleeping, body calm, she did not open eyes to voice or touch, she was nonverbal and not following commands. She did respond to touch, by moving foot or shoulder away. 3. Advanced care planning discussion-no AMD in the EMR. Patient is , she has at least 3 adult children.   In the absence of an AMD, all of her children would serve as primary healthcare decision makers, meeting majority consensus. 4. DNR discussion-patient has full CODE STATUS. Per documentation CODE STATUS was discussed with patient's son earlier in the day at which time he reiterated full code. 5. Goals of care discussion-at this time seem to be full restorative. We will continue to follow along course of hospitalization and assist with readdressing goals of care as needed  6. Altered mental status, unspecified-unresolved, etiology likely post ictal state  7. Initial consult note routed to primary continuity provider and/or primary health care team members  8. Communicated plan of care with: Palliative [facility] Team     GOALS OF CARE / TREATMENT PREFERENCES:     GOALS OF CARE:       TREATMENT PREFERENCES:   Code Status: Full Code    Advance Care Planning:  [] The [facility] Interdisciplinary Team has updated the ACP Navigator with [place] and Patient Capacity      Primary Decision Maker: Tom Diego - Child - [phone]    Primary Decision Maker: Kelin Flores - Child - [phone]    Primary Decision Maker: Lyssa Floyd - Daughter - [phone]  No flowsheet data found. Medical Interventions: Full interventions     Other Instructions: Other:    As far as possible, the palliative care team has discussed with patient / health care proxy about goals of care / treatment preferences for patient. HISTORY:     History obtained from: Medical records/nurse  CHIEF COMPLAINT: N/A    HPI/SUBJECTIVE:    The patient is:   [] Verbal and participatory  [x] Non-participatory due to:   Patient did not wake to voice, was nonverbal and unable to follow commands, unable to provide ROS.   Patient appeared comfortable, no signs of distress    Clinical Pain Assessment (nonverbal scale for severity on nonverbal patients):   Clinical Pain Assessment  Severity: 0  Location: minimally responsive, non verbal, and not following commands  Character: minimally responsive, non verbal, and not following commands  Duration: minimally responsive, non verbal, and not following commands  Effect: minimally responsive, non verbal, and not following commands  Factors: minimally responsive, non verbal, and not following commands  Frequency: minimally responsive, non verbal, and not following commands     Activity (Movement): Lying quietly, normal position    Duration: for how long has pt been experiencing pain (e.g., 2 days, 1 month, years)  Frequency: how often pain is an issue (e.g., several times per day, once every few days, constant)     FUNCTIONAL ASSESSMENT:     Palliative Performance Scale (PPS):  PPS: 20       PSYCHOSOCIAL/SPIRITUAL SCREENING:     Palliative IDT has assessed this patient for cultural preferences / practices and a referral made as appropriate to needs (Cultural Services, Patient Advocacy, Ethics, etc.)    Any spiritual / Yazidi concerns:  [] Yes /  [x] No    Caregiver Burnout:  [] Yes /  [x] No /  [] No Caregiver Present      Anticipatory grief assessment:   [x] Normal  / [] Maladaptive       ESAS Anxiety: Anxiety: 0    ESAS Depression:          REVIEW OF SYSTEMS:     Positive and pertinent negative findings in ROS are noted above in HPI. The following systems were [x] reviewed / [] unable to be reviewed as noted in HPI  Other findings are noted below. Systems: constitutional, ears/nose/mouth/throat, respiratory, gastrointestinal, genitourinary, musculoskeletal, integumentary, neurologic, psychiatric, endocrine. Positive findings noted below.   Modified ESAS Completed by: provider   Fatigue: 10 Drowsiness: 10     Pain: 0   Anxiety: 0 Nausea: 0   Anorexia: 10 Dyspnea: 0                    PHYSICAL EXAM:     From RN flowsheet:  Wt Readings from Last 3 Encounters:   11/27/20 82 lb 14.3 oz (37.6 kg)   11/27/20 82 lb 14.3 oz (37.6 kg)   01/01/20 101 lb (45.8 kg)     Blood pressure (!) 148/66, pulse 86, temperature 97.9 °F (36.6 °C), resp. rate 20, height 4' 10\" (1.473 m), weight 82 lb 14.3 oz (37.6 kg), SpO2 98 %. Pain Scale 1: Adult Nonverbal Pain Scale                    Last bowel movement, if known:     Constitutional: Elderly, thin, frail, NAD  Eyes: Eyes closed did not force open to examine  ENMT: no nasal discharge, dry mucous membranes  Cardiovascular: regular rhythm, distal pulses intact  Respiratory: breathing not labored, symmetric  Gastrointestinal: soft non-tender, +bowel sounds  Musculoskeletal: no deformity, no tenderness to palpation  Skin: warm, dry  Neurologic: Sleeping, eyes closed, did not open eyes to examine, nonverbal not following commands  Psychiatric: Unable to assess  Other:       HISTORY:     Active Problems:    Intractable seizures ([address] (11/27/2020)      Hypokalemia (11/27/2020)      HTN (hypertension) (11/27/2020)      Acute encephalopathy (11/27/2020)      Past Medical History:   Diagnosis Date    Hypertension     Seizure ([address]     Seizures ([address]       Past Surgical History:   Procedure Laterality Date    HX ORTHOPAEDIC      hip, shoulder, elbow      History reviewed. No pertinent family history. History reviewed, no pertinent family history.   Social History     Tobacco Use    Smoking status: Never Smoker    Smokeless tobacco: Never Used   Substance Use Topics    Alcohol use: Not Currently     Allergies   Allergen Reactions    Penicillins Anaphylaxis      Current Facility-Administered Medications   Medication Dose Route Frequency    melatonin tablet 3 mg  3 mg Oral QHS    acetaminophen (TYLENOL) tablet 650 mg  650 mg Oral Q4H PRN    Or    acetaminophen (TYLENOL) solution 650 mg  650 mg Per NG tube Q4H PRN    Or    acetaminophen (TYLENOL) suppository 650 mg  650 mg Rectal Q4H PRN    atorvastatin (LIPITOR) tablet 40 mg  40 mg Oral QHS    hydrALAZINE (APRESOLINE) 20 mg/mL injection 5 mg  5 mg IntraVENous Q6H PRN    levETIRAcetam (KEPPRA) 500 mg in saline (iso-osm) 100 mL IVPB (premix)  500 mg IntraVENous Q12H    LORazepam (ATIVAN) injection 0.5 mg  0.5 mg IntraVENous Q2H PRN    aspirin (ASA) suppository 300 mg  300 mg Rectal DAILY    dextrose 5% - 0.9% NaCl with KCl 20 mEq/L infusion  50 mL/hr IntraVENous CONTINUOUS    heparin (porcine) injection 5,000 Units  5,000 Units SubCUTAneous Q12H          LAB AND IMAGING FINDINGS:     Lab Results   Component Value Date/Time    WBC 6.9 11/28/2020 02:25 AM    HGB 10.4 (L) 11/28/2020 02:25 AM    PLATELET 014 62/93/4423 02:25 AM     Lab Results   Component Value Date/Time    Sodium 139 11/28/2020 02:25 AM    Potassium 3.2 (L) 11/28/2020 02:25 AM    Chloride 109 (H) 11/28/2020 02:25 AM    CO2 25 11/28/2020 02:25 AM    BUN 14 11/28/2020 02:25 AM    Creatinine 0.65 11/28/2020 02:25 AM    Calcium 8.4 (L) 11/28/2020 02:25 AM    Magnesium 1.7 11/27/2020 01:56 AM    Phosphorus 2.4 (L) 11/27/2020 01:56 AM      Lab Results   Component Value Date/Time    Alk. phosphatase 60 11/27/2020 01:56 AM    Protein, total 7.3 11/27/2020 01:56 AM    Albumin 3.5 11/27/2020 01:56 AM    Globulin 3.8 11/27/2020 01:56 AM     Lab Results   Component Value Date/Time    INR 1.0 03/17/2015 01:05 PM    Prothrombin time 10.0 03/17/2015 01:05 PM    aPTT 27.0 07/08/2009 03:30 AM      No results found for: IRON, FE, TIBC, IBCT, PSAT, FERR   No results found for: PH, PCO2, PO2  No components found for: [place]   Lab Results   Component Value Date/Time    CK 69 12/16/2019 03:33 AM    CK - MB 2.0 03/17/2015 04:50 PM                Total time: 50 min  Counseling / coordination time, spent as noted above:35 min  > 50% counseling / coordination?: yes    Prolonged service was provided for  []30 min   []75 min in face to face time in the presence of the patient, spent as noted above. Time Start:   Time End:   Note: this can only be billed with 73771 (initial) or 97579 (follow up). If multiple start / stop times, list each separately.

## 2020-11-29 NOTE — PROGRESS NOTES
Bedside, Verbal and Written shift change report given to Caroline Singleton (oncoming nurse) by Sarina Lantigua (offgoing nurse). Report included the following information SBAR, Kardex, MAR and Recent Results.

## 2020-11-30 LAB
ALBUMIN SERPL-MCNC: 2.9 G/DL (ref 3.5–5)
ALBUMIN/GLOB SERPL: 0.9 {RATIO} (ref 1.1–2.2)
ALP SERPL-CCNC: 61 U/L (ref 45–117)
ALT SERPL-CCNC: 19 U/L (ref 12–78)
ANION GAP SERPL CALC-SCNC: 6 MMOL/L (ref 5–15)
AST SERPL-CCNC: 32 U/L (ref 15–37)
BILIRUB SERPL-MCNC: 0.4 MG/DL (ref 0.2–1)
BUN SERPL-MCNC: 10 MG/DL (ref 6–20)
BUN/CREAT SERPL: 19 (ref 12–20)
CALCIUM SERPL-MCNC: 9.1 MG/DL (ref 8.5–10.1)
CHLORIDE SERPL-SCNC: 112 MMOL/L (ref 97–108)
CO2 SERPL-SCNC: 25 MMOL/L (ref 21–32)
CREAT SERPL-MCNC: 0.54 MG/DL (ref 0.55–1.02)
ERYTHROCYTE [DISTWIDTH] IN BLOOD BY AUTOMATED COUNT: 12.5 % (ref 11.5–14.5)
GLOBULIN SER CALC-MCNC: 3.4 G/DL (ref 2–4)
GLUCOSE SERPL-MCNC: 110 MG/DL (ref 65–100)
HCT VFR BLD AUTO: 34.6 % (ref 35–47)
HGB BLD-MCNC: 11.3 G/DL (ref 11.5–16)
MAGNESIUM SERPL-MCNC: 1.9 MG/DL (ref 1.6–2.4)
MCH RBC QN AUTO: 32.1 PG (ref 26–34)
MCHC RBC AUTO-ENTMCNC: 32.7 G/DL (ref 30–36.5)
MCV RBC AUTO: 98.3 FL (ref 80–99)
NRBC # BLD: 0 K/UL (ref 0–0.01)
NRBC BLD-RTO: 0 PER 100 WBC
PLATELET # BLD AUTO: 164 K/UL (ref 150–400)
PMV BLD AUTO: 10.6 FL (ref 8.9–12.9)
POTASSIUM SERPL-SCNC: 3.7 MMOL/L (ref 3.5–5.1)
PROT SERPL-MCNC: 6.3 G/DL (ref 6.4–8.2)
RBC # BLD AUTO: 3.52 M/UL (ref 3.8–5.2)
SODIUM SERPL-SCNC: 143 MMOL/L (ref 136–145)
WBC # BLD AUTO: 7.4 K/UL (ref 3.6–11)

## 2020-11-30 PROCEDURE — 65660000000 HC RM CCU STEPDOWN

## 2020-11-30 PROCEDURE — 97162 PT EVAL MOD COMPLEX 30 MIN: CPT

## 2020-11-30 PROCEDURE — 74011250636 HC RX REV CODE- 250/636: Performed by: INTERNAL MEDICINE

## 2020-11-30 PROCEDURE — 74011250636 HC RX REV CODE- 250/636: Performed by: NURSE PRACTITIONER

## 2020-11-30 PROCEDURE — 83735 ASSAY OF MAGNESIUM: CPT

## 2020-11-30 PROCEDURE — 80053 COMPREHEN METABOLIC PANEL: CPT

## 2020-11-30 PROCEDURE — 97165 OT EVAL LOW COMPLEX 30 MIN: CPT

## 2020-11-30 PROCEDURE — 85027 COMPLETE CBC AUTOMATED: CPT

## 2020-11-30 PROCEDURE — 97535 SELF CARE MNGMENT TRAINING: CPT

## 2020-11-30 PROCEDURE — 36415 COLL VENOUS BLD VENIPUNCTURE: CPT

## 2020-11-30 PROCEDURE — 92610 EVALUATE SWALLOWING FUNCTION: CPT

## 2020-11-30 PROCEDURE — 97530 THERAPEUTIC ACTIVITIES: CPT

## 2020-11-30 PROCEDURE — 74011250637 HC RX REV CODE- 250/637: Performed by: INTERNAL MEDICINE

## 2020-11-30 PROCEDURE — 74011250637 HC RX REV CODE- 250/637: Performed by: NURSE PRACTITIONER

## 2020-11-30 RX ORDER — LISINOPRIL 5 MG/1
10 TABLET ORAL DAILY
Status: DISCONTINUED | OUTPATIENT
Start: 2020-11-30 | End: 2020-12-03 | Stop reason: HOSPADM

## 2020-11-30 RX ADMIN — POTASSIUM CHLORIDE, DEXTROSE MONOHYDRATE AND SODIUM CHLORIDE: 300; 5; 900 INJECTION, SOLUTION INTRAVENOUS at 21:55

## 2020-11-30 RX ADMIN — HYDRALAZINE HYDROCHLORIDE 5 MG: 20 INJECTION INTRAMUSCULAR; INTRAVENOUS at 05:56

## 2020-11-30 RX ADMIN — HEPARIN SODIUM 5000 UNITS: 5000 INJECTION INTRAVENOUS; SUBCUTANEOUS at 21:55

## 2020-11-30 RX ADMIN — LEVETIRACETAM 500 MG: 5 INJECTION INTRAVENOUS at 22:10

## 2020-11-30 RX ADMIN — LEVETIRACETAM 500 MG: 5 INJECTION INTRAVENOUS at 08:52

## 2020-11-30 RX ADMIN — Medication 3 MG: at 21:55

## 2020-11-30 RX ADMIN — AMLODIPINE BESYLATE 5 MG: 5 TABLET ORAL at 08:52

## 2020-11-30 RX ADMIN — ATORVASTATIN CALCIUM 40 MG: 40 TABLET, FILM COATED ORAL at 21:55

## 2020-11-30 RX ADMIN — HALOPERIDOL LACTATE 1 MG: 5 INJECTION, SOLUTION INTRAMUSCULAR at 06:02

## 2020-11-30 RX ADMIN — HEPARIN SODIUM 5000 UNITS: 5000 INJECTION INTRAVENOUS; SUBCUTANEOUS at 08:51

## 2020-11-30 RX ADMIN — ASPIRIN 300 MG: 300 SUPPOSITORY RECTAL at 08:52

## 2020-11-30 NOTE — PROGRESS NOTES
Problem: Mobility Impaired (Adult and Pediatric)  Goal: *Acute Goals and Plan of Care (Insert Text)  Description:   FUNCTIONAL STATUS PRIOR TO ADMISSION: Patient was sba using a SB quad cane for functional mobility. Recent falls in to last 2 weeks. HOME SUPPORT PRIOR TO ADMISSION: The patient lived alone with son to provide assistance. Physical Therapy Goals  Initiated 11/30/2020  1. Patient will move from supine to sit and sit to supine  and roll side to side in bed with minimal assistance/contact guard assist within 7 day(s). 2.  Patient will transfer from bed to chair and chair to bed with moderate assistance  using the least restrictive device within 7 day(s). 3.  Patient will perform sit to stand with moderate assistance  within 7 day(s). 4.  Patient will ambulate with moderate assistance  for 15 feet with the least restrictive device within 7 day(s). 5.  Patient will improve Rosa Balance score by 7 points within 7 days. Outcome: Not Met   PHYSICAL THERAPY EVALUATION  Patient: Shelah Hammans (67 y.o. female)  Date: 11/30/2020  Primary Diagnosis: [facility] [G40.919]        Precautions:  Fall, Bed Alarm, Skin    ASSESSMENT  Based on the objective data described below, the patient presents with generalized weakness with R>L, impaired balance in sitting and standing, decreased activity tolerance, impaired mobility skills with high risk for falls following admission for intractable seizures on 11/27/20. She lives with her supportive son and he wants her to return there rather than a nursing home. She was able to come to sitting on the edge of bed with mod a x 2. Sitting balance limited by delayed reactions and poor posture. She needed mod/max a x 2 for sit to stand and max a x 2 for bed to chair transfers. Needed to have R knee manually locked to prevent buckling when moving LLE. She was positioned in midline with legs elevated and the chair in tilt.  Alarm pad pre-placed and turned on for fall safety. She demonstrated understanding on use of call bell. Recommend front loaded HH PT and OT visits upon discharge as well as a wheelchair and BSC if the patient does not have these. Current Level of Function Impacting Discharge (mobility/balance): mod/max a x 2    Functional Outcome Measure: The patient scored 1/56 on the Rosa outcome measure which is indicative of high fall risk. Other factors to consider for discharge: sba ambulation with quad cane PLOF; lives with supportive son. Patient will benefit from skilled therapy intervention to address the above noted impairments. PLAN :  Recommendations and Planned Interventions: bed mobility training, transfer training, gait training, therapeutic exercises, neuromuscular re-education, patient and family training/education, and therapeutic activities      Frequency/Duration: Patient will be followed by physical therapy:  4 times a week to address goals. Recommendation for discharge: (in order for the patient to meet his/her long term goals)  Therapy up to 5 days/week in SNF setting or intensive home health therapy program    This discharge recommendation:  Has been made in collaboration with the attending provider and/or case management    IF patient discharges home will need the following DME: bedside commode and wheelchair         SUBJECTIVE:   Patient stated Keila Serge you so much for getting me in the chair.     OBJECTIVE DATA SUMMARY:   HISTORY:    Past Medical History:   Diagnosis Date    Hypertension     Seiz[facility] ([facility]     Seizures ([facility]      Past Surgical History:   Procedure Laterality Date    HX ORTHOPAEDIC      hip, shoulder, elbow       Personal factors and/or comorbidities impacting plan of care: advanced age; significant functional impairments    Home Situation  Home Environment: Private residence  # Steps to Enter: 5  Rails to Enter: Yes  Hand Rails : Bilateral  Wheelchair Ramp: No  One/Two Story Residence: [facility] story  Living Alone: No  Support Systems: Child(rosario)(son)  Patient Expects to be Discharged to[de-identified] Private residence  Current DME Used/Available at Home: Armida Distad, quad, Tub transfer bench, Grab bars(hand held shower)  Tub or Shower Type: Tub/Shower combination    EXAMINATION/PRESENTATION/DECISION MAKING:   Critical Behavior:  Neurologic State: Alert  Orientation Level: Oriented to person, Disoriented to situation, Disoriented to place, Disoriented to time  Cognition: Impulsive, Impaired decision making, Decreased attention/concentration     Hearing: Auditory  Auditory Impairment: Hard of hearing, bilateral(speaker must repeat nearly everything; limited by masks )  Skin:  no findings  Edema: L hand mild edema from IV infiltration - RN has already stopped IV  Range Of Motion:  AROM: Generally decreased, functional           PROM: Generally decreased, functional           Strength:    Strength: Generally decreased, functional(R hemiparesis; 3-/5 to 2-/5)                    Tone & Sensation:   Tone: Normal                              Coordination:  Coordination: Generally decreased, functional  Vision:   Acuity: Impaired far vision;Able to read employee name badge without difficulty; Able to read newspaper without difficulty; Able to read normal print without difficulty  Functional Mobility:  Bed Mobility:  Rolling: Minimum assistance; Moderate assistance  Supine to Sit: Moderate assistance;Assist x2     Scooting: Maximum assistance  Transfers:  Sit to Stand:  Moderate assistance;Maximum assistance;Assist x2  Stand to Sit: Moderate assistance;Assist x2        Bed to Chair: Maximum assistance;Assist x2              Balance:   Sitting: Impaired  Sitting - Static: Fair (occasional)  Sitting - Dynamic: Poor (constant support)  Standing: Impaired  Standing - Static: Poor;Constant support  Standing - Dynamic : Poor;Constant support  Ambulation/Gait Training:              Gait Description (WDL): (deferred due to weakness and poor balance)     Right Side Weight Bearing: Full  Left Side Weight Bearing: Full                            Therapeutic Exercises:   Knee/hip flexion; hip abd    Functional Measure:  Rosa Balance Test:    Sitting to Standin  Standing Unsupported: 0  Sitting with Back Unsupported: 1  Standing to Sittin  Transfers: 0  Standing Unsupported with Eyes Closed: 0  Standing Unsupported with Feet Together: 0  Reach Forward with Outstretched Arm: 0   Object: 0  Turn to Look Over Shoulders: 0  Turn 360 Degrees: 0  Alternate Foot on Step/Stool: 0  Standing Unsupported One Foot in Front: 0  Stand on One Le  Total:          56=Maximum possible score;   0-20=High fall risk  21-40=Moderate fall risk   41-56=Low fall risk        Physical Therapy Evaluation Charge Determination   History Examination Presentation Decision-Making   HIGH Complexity :3+ comorbidities / personal factors will impact the outcome/ POC  MEDIUM Complexity : 3 Standardized tests and measures addressing body structure, function, activity limitation and / or participation in recreation  MEDIUM Complexity : Evolving with changing characteristics  Other outcome measures Rosa  HIGH       Based on the above components, the patient evaluation is determined to be of the following complexity level: MEDIUM    Pain Rating:  R knee    Activity Tolerance:   Fair, SpO2 stable on RA, and requires rest breaks    After treatment patient left in no apparent distress:   Sitting in chair, Call bell within reach, and Bed / chair alarm activated    COMMUNICATION/EDUCATION:   The patients plan of care was discussed with: Occupational therapist and Registered nurse. Fall prevention education was provided and the patient/caregiver indicated understanding., Patient/family have participated as able in goal setting and plan of care. , and Patient/family agree to work toward stated goals and plan of care.     Thank you for this referral.  Feliz Rios, PT   Time Calculation: 30 mins

## 2020-11-30 NOTE — PROGRESS NOTES
Comprehensive Nutrition Assessment    Type and Reason for Visit: Reassess, Consult    Nutrition Recommendations/Plan:   Continue Dysphagia advanced soft diet  Ensure enlive TID    Nutrition Assessment:     Chart reviewed; medically noted for seizures, HTN, and encephalopathy. Noted episodes of confusion and agitation. Patient being set up for lunch at time of visit. She reports being hard of hearing so had difficulty answering questions. She states her appetite is fine and she likes the ensure enlive. Actively taking sips of supplement during visit. Continue liberalized diet. Encourage intake of meals/supplements. Assist with PO as needed. Estimated Daily Nutrient Needs:  Energy (kcal): 1201 kcal ( x 1.3AF +350kcal); Weight Used for Energy Requirements: Current  Protein (g): 46-57g (1.2-1.5 g/kg bw); Weight Used for Protein Requirements: Current  Fluid (ml/day): 1200 mL; Method Used for Fluid Requirements: 1 ml/kcal    Nutrition Related Findings:    No documented BM, +BS  Medications: Norvasc, Atorvastatin, Keppra, Lisinopril, D5% IVF + KCl      Wounds:    None       Current Nutrition Therapies:  DIET DYSPHAGIA ADVANCED SOFT (NDD3)  DIET NUTRITIONAL SUPPLEMENTS Breakfast, Dinner, Lunch;  Ensure Enlive    Anthropometric Measures:  · Height:  4' 9.99\" (147.3 cm)  · Current Body Wt:  37.6 kg (82 lb 14.3 oz)    · BMI Category:  Underweight (BMI less than 18.5)       Nutrition Diagnosis:   · Underweight related to inadequate protein-energy intake(advanced age) as evidenced by HOY(87.12)    Nutrition Interventions:   Food and/or Nutrient Delivery: Continue current diet, Continue oral nutrition supplement  Nutrition Education and Counseling: No recommendations at this time  Coordination of Nutrition Care: No recommendation at this time    Goals:  PO intake >50% of meals/supplements next 3-5 days       Nutrition Monitoring and Evaluation:   Behavioral-Environmental Outcomes: None identified  Food/Nutrient Intake Outcomes: Food and nutrient intake, Supplement intake  Physical Signs/Symptoms Outcomes: Biochemical data, Weight    Discharge Planning:    Continue current diet, Continue oral nutrition supplement     Electronically signed by Mayra Montano RD on 11/30/2020 at 12:04 PM    Contact: ext [phone]

## 2020-11-30 NOTE — PROGRESS NOTES
Problem: Pressure Injury - Risk of  Goal: *Prevention of pressure injury  Description: Document Danilo Scale and appropriate interventions in the flowsheet.   11/30/2020 1821 by Jaqueline Dumont  Outcome: Progressing Towards Goal  Note: Pressure Injury Interventions:  Sensory Interventions: Assess changes in LOC, Float heels, Keep linens dry and wrinkle-free    Moisture Interventions: Absorbent underpads, Assess need for specialty bed, Limit adult briefs, Minimize layers    Activity Interventions: Increase time out of bed    Mobility Interventions: Assess need for specialty bed, Pressure redistribution bed/mattress (bed type)    Nutrition Interventions: Offer support with meals,snacks and hydration    Friction and Shear Interventions: Apply protective barrier, creams and emollients, HOB 30 degrees or less, Minimize layers             11/30/2020 1820 by Jaqueline Dumont  Outcome: Progressing Towards Goal  Note: Pressure Injury Interventions:  Sensory Interventions: Assess changes in LOC, Float heels, Keep linens dry and wrinkle-free    Moisture Interventions: Absorbent underpads, Assess need for specialty bed, Limit adult briefs, Minimize layers    Activity Interventions: Increase time out of bed    Mobility Interventions: Assess need for specialty bed, Pressure redistribution bed/mattress (bed type)    Nutrition Interventions: Offer support with meals,snacks and hydration    Friction and Shear Interventions: Apply protective barrier, creams and emollients, HOB 30 degrees or less, Minimize layers                Problem: Patient Education: Go to Patient Education Activity  Goal: Patient/Family Education  11/30/2020 1821 by Jaqueline Dumont  Outcome: Progressing Towards Goal  11/30/2020 1820 by Jaqueline Dumont  Outcome: Progressing Towards Goal     Problem: Seizure Disorder (Adult)  Goal: *STG: Remains free of seizure activity  11/30/2020 1821 by Jaqueline Work4  Outcome: Progressing Towards Goal  11/30/2020 1820 by Sushil David  Outcome: Progressing Towards Goal  Goal: *STG: Maintains lab values within therapeutic range  11/30/2020 1821 by Sushil David  Outcome: Progressing Towards Goal  11/30/2020 1820 by Sushil David  Outcome: Progressing Towards Goal  Goal: *STG/LTG: Complies with medication therapy  11/30/2020 1821 by Sushil David  Outcome: Progressing Towards Goal  11/30/2020 1820 by Sushil David  Outcome: Progressing Towards Goal  Goal: *STG: Remains free of injury during seizure activity  11/30/2020 1821 by Sushil David  Outcome: Progressing Towards Goal  11/30/2020 1820 by Sushil David  Outcome: Progressing Towards Goal  Goal: *STG: Remains safe in hospital  11/30/2020 1821 by Sushil David  Outcome: Progressing Towards Goal  11/30/2020 1820 by Sushil David  Outcome: Progressing Towards Goal  Goal: Interventions  11/30/2020 1821 by Sushil David  Outcome: Progressing Towards Goal  11/30/2020 1820 by Sushil David  Outcome: Progressing Towards Goal     Problem: Patient Education: Go to Patient Education Activity  Goal: Patient/Family Education  11/30/2020 1821 by Sushil David  Outcome: Progressing Towards Goal  11/30/2020 1820 by Sushil David  Outcome: Progressing Towards Goal     Problem: Falls - Risk of  Goal: *Absence of Falls  Description: Document Anastacio Lopez Fall Risk and appropriate interventions in the flowsheet.   11/30/2020 1821 by Sushil David  Outcome: Progressing Towards Goal  Note: Fall Risk Interventions:  Mobility Interventions: Bed/chair exit alarm, Patient to call before getting OOB, Utilize gait belt for transfers/ambulation    Mentation Interventions: Adequate sleep, hydration, pain control, Bed/chair exit alarm, Door open when patient unattended, Gait belt with transfers/ambulation, Increase mobility, More frequent rounding, Toileting rounds, Update white board    Medication Interventions: Bed/chair exit alarm, Patient to call before getting OOB, Teach patient to arise slowly, Utilize gait belt for transfers/ambulation    Elimination Interventions: Bed/chair exit alarm, Call light in reach, Patient to call for help with toileting needs, Toileting schedule/hourly rounds    History of Falls Interventions: Bed/chair exit alarm, Door open when patient unattended, Utilize gait belt for transfer/ambulation      11/30/2020 1820 by Raven Cover  Outcome: Progressing Towards Goal  Note: Fall Risk Interventions:  Mobility Interventions: Bed/chair exit alarm, Patient to call before getting OOB, Utilize gait belt for transfers/ambulation    Mentation Interventions: Adequate sleep, hydration, pain control, Bed/chair exit alarm, Door open when patient unattended, Gait belt with transfers/ambulation, Increase mobility, More frequent rounding, Toileting rounds, Update white board    Medication Interventions: Bed/chair exit alarm, Patient to call before getting OOB, Teach patient to arise slowly, Utilize gait belt for transfers/ambulation    Elimination Interventions: Bed/chair exit alarm, Call light in reach, Patient to call for help with toileting needs, Toileting schedule/hourly rounds    History of Falls Interventions: Bed/chair exit alarm, Door open when patient unattended, Utilize gait belt for transfer/ambulation         Problem: Patient Education: Go to Patient Education Activity  Goal: Patient/Family Education  11/30/2020 1821 by Raven Cover  Outcome: Progressing Towards Goal  11/30/2020 1820 by Raven Cover  Outcome: Progressing Towards Goal

## 2020-11-30 NOTE — PROGRESS NOTES
Patient is complaining of pain at the IV site. IV flushes well. PICC team has been notified to see if they can assist. Waiting for new IV site to be started, will continue medications once new IV is in place       [id number]- still waiting for PICC team to start new IV.  Patient will not allow us to use other IV site

## 2020-11-30 NOTE — PROGRESS NOTES
Occupational Therapy  Chart reviewed; cleared for tx but currently with nsg procedure; will retry later as able.  Jerome Houston OTR/L

## 2020-11-30 NOTE — PROGRESS NOTES
SPEECH PATHOLOGY BEDSIDE SWALLOW EVALUATION/DISCHARGE  Patient: Patria Hernandez (24 y.o. female)  Date: 11/30/2020  Primary Diagnosis: [facility] [G40.919]       Precautions:        ASSESSMENT :  Based on the objective data described below, the patient presents with functional oropharyngeal swallow for advanced age. Timely mastication with soft solids, suspect mildly delayed swallow initiation which is expected given advanced age. No s/s aspiration with successive straw sips of thin, cup sips of thin, puree or soft solid. Skilled acute therapy provided by a speech-language pathologist is not indicated at this time. PLAN :  Recommendations:  -- continue advanced soft diet/ thin liquids. Straws ok  -- meds 1 at a time in applesauce  -- strict upright positioning, tray set up    Discharge Recommendations: None     SUBJECTIVE:   Patient stated thank you. OBJECTIVE:     Past Medical History:   Diagnosis Date    Hypertension     [facility] ([facility]     Seizures ([facility]      Past Surgical History:   Procedure Laterality Date    HX ORTHOPAEDIC      hip, shoulder, elbow     Prior Level of Function/Home Situation:      Diet prior to admission: unknown  Current Diet:  NDD3/thin   Cognitive and Communication Status:  Neurologic State: Alert, Confused  Orientation Level: Oriented to person  Cognition: Decreased attention/concentration, Follows commands           Oral Assessment:     P.O. Trials:  Patient Position: (up in bed)  Vocal quality prior to P.O.: No impairment  Consistency Presented: Thin liquid;Mechanical soft;Puree  How Presented: Successive swallows;Straw;Cup/sip; Self-fed/presented;Spoon     Bolus Acceptance: No impairment  Bolus Formation/Control: Impaired  Type of Impairment: Delayed;Mastication  Propulsion: Delayed (# of seconds)  Oral Residue: None  Initiation of Swallow: No impairment  Laryngeal Elevation: Functional  Aspiration Signs/Symptoms: None  Pharyngeal Phase Characteristics: No impairment, issues, or problems   Effective Modifications: None  Cues for Modifications: None       Oral Phase Severity: No impairment  Pharyngeal Phase Severity : No impairment  NOMS:   The NOMS functional outcome measure was used to quantify this patient's level of swallowing impairment. Based on the NOMS, the patient was determined to be at level 6 for swallow function     NOMS Swallowing Levels:  Level 1 (CN): NPO  Level 2 (CM): NPO but takes consistency in therapy  Level 3 (CL): Takes less than 50% of nutrition p.o. and continues with nonoral feedings; and/or safe with mod cues; and/or max diet restriction  Level 4 (CK): Safe swallow but needs mod cues; and/or mod diet restriction; and/or still requires some nonoral feeding/supplements  Level 5 (CJ): Safe swallow with min diet restriction; and/or needs min cues  Level 6 (CI): Independent with p.o.; rare cues; usually self cues; may need to avoid some foods or needs extra time  Level 7 ([address] [place] [address]): Independent for all p.o.  WOODY. (2003). National Outcomes Measurement System (NOMS): Adult Speech-Language Pathology User's Guide. Pain:  Pain Scale 1: FLACC        After treatment:   Patient left in no apparent distress in bed, Call bell within reach and Nursing notified    COMMUNICATION/EDUCATION:     The patient's plan of care including recommendations, planned interventions, and recommended diet changes were discussed with: Registered nurse.      Thank you for this referral.  Tonia Ruff, SLP  Time Calculation: 11 mins

## 2020-11-30 NOTE — PROGRESS NOTES
Reason for Admission:   Intractable Seizures                   RUR Score:   14%                  Plan for utilizing home health:    Possible [facility]     PCP: First and Last name:  Ander Cooks    Name of Practice:    Are you a current patient: Yes/No: YES    Approximate date of last visit: Seen Yearly-had upcoming appointment   Can you participate in a virtual visit with your PCP: Yes, with family assistance                     Current Advanced Directive/Advance Care Plan: FULL-SONArita Sames                          Transition of Care Plan:                      CM completed assessment with pt's son: Aramis Nobles. Aramis Nobles reported that he resides with pt in their one story home. Aramis Nobles reported that pt had an upcoming appointment with PCP: Dr. Ander Cooks, but he has recently canceled appointment due to pt being admitted to [address] [place] [address]. Pt is known to use [facility] ([address] Pt needs assistance with ADLs, and does not drive. Aramis Nobles reported that pt will require transport home at the time of d/c. Pt is known to use DME at home: cane. Pt's son requested wheelchair from CM. CM will send referral to DME provider: [employer], to attempt pt approval for wheelchair. Pt son reported SNF and HHC, in the past.    Pt's son listed as medical decision maker when discussing ACP. Aramis Nobles reported that that he wish for pt to return home vs snf placement, due to covid. CM will continue to follow. Care Management Interventions  PCP Verified by CM: Yes  Mode of Transport at Discharge: Other (see comment)  Transition of Care Consult (CM Consult): Discharge Planning  Discharge Durable Medical Equipment: No  Physical Therapy Consult: Yes  Occupational Therapy Consult: Yes  Speech Therapy Consult: Yes  Current Support Network:  Other, Own Home(SON LIVES IN THE HOME )  Confirm Follow Up Transport: Family  Discharge Location  Discharge Placement: [facility]/ [facility] [address], MSW, [address] [place] [address]

## 2020-11-30 NOTE — PROGRESS NOTES
End of Shift Note    Bedside shift change report given to Sylvester Tran (oncoming nurse) by Mickey Alegre (offgoing nurse). Report included the following information SBAR, MAR and Recent Results    Shift worked:  5240-8014   Shift summary and any significant changes:    none       Concerns for physician to address:  none   Zone phone for oncoming shift:   [phone]     Patient Information  Nick Perry  80 y.o.  11/27/2020  1:30 AM by Sena Champagne MD. Nick Perry was admitted from Home    Problem List  Patient Active Problem List    Diagnosis Date Noted    Intractable seizures ([facility] [address] 11/27/2020    Hypokalemia 11/27/2020    HTN (hypertension) 11/27/2020    Acute encephalopathy 11/27/2020     Past Medical History:   Diagnosis Date    Hypertension     Seizure ([facility] [address]     Seizures ([facility] [address]        Core Measures:  CVA: No No  CHF:No No  PNA:No No    Activity:  Activity Level: Up with Assistance  Number times ambulated in hallways past shift: 0  Number of times OOB to chair past shift: 1    Cardiac:   Cardiac Monitoring: Yes      Cardiac Rhythm: Normal sinus rhythm    Access:   Current line(s): PIV       Genitourinary:   Urinary status: incontinent       Respiratory:   O2 Device: Room air  Chronic home O2 use?: NO  Incentive spirometer at bedside: NO       GI:     Current diet:  DIET DYSPHAGIA ADVANCED SOFT (NDD3)  DIET NUTRITIONAL SUPPLEMENTS Breakfast, Dinner, Lunch; Ensure Enlive  Passing flatus: YES  Tolerating current diet: NPO       Pain Management:   Patient states pain is manageable on current regimen: N/A    Skin:  Danilo Score: 12  Interventions: limit briefs and internal/external urinary devices    Patient Safety:  Fall Score:  Total Score: 4  Interventions: bed/chair alarm, gripper socks, pt to call before getting OOB, stay with me (per policy) and gait belt  High Fall Risk: Yes    DVT prophylaxis:  DVT prophylaxis Med- Yes  DVT prophylaxis SCD or TERRY- No     Wounds: (If Applicable)  Wounds- No  Location Active Consults:  IP CONSULT TO NEUROLOGY  IP CONSULT TO PALLIATIVE CARE - PROVIDER    Length of Stay:  Expected LOS: 2d 16h  Actual LOS: 3  Discharge Plan: No tbd      [facility]

## 2020-11-30 NOTE — PROGRESS NOTES
Problem: Pressure Injury - Risk of  Goal: *Prevention of pressure injury  Description: Document Danilo Scale and appropriate interventions in the flowsheet. Outcome: Progressing Towards Goal  Note: Pressure Injury Interventions:  Sensory Interventions: Assess changes in LOC, Float heels, Keep linens dry and wrinkle-free    Moisture Interventions: Absorbent underpads, Assess need for specialty bed, Limit adult briefs, Minimize layers    Activity Interventions: Increase time out of bed    Mobility Interventions: Assess need for specialty bed, Pressure redistribution bed/mattress (bed type)    Nutrition Interventions: Offer support with meals,snacks and hydration    Friction and Shear Interventions: Apply protective barrier, creams and emollients, HOB 30 degrees or less, Minimize layers                Problem: Patient Education: Go to Patient Education Activity  Goal: Patient/Family Education  Outcome: Progressing Towards Goal     Problem: Seizure Disorder (Adult)  Goal: *STG: Remains free of seizure activity  Outcome: Progressing Towards Goal  Goal: *STG: Maintains lab values within therapeutic range  Outcome: Progressing Towards Goal  Goal: *STG/LTG: Complies with medication therapy  Outcome: Progressing Towards Goal  Goal: *STG: Remains free of injury during seizure activity  Outcome: Progressing Towards Goal  Goal: *STG: Remains safe in hospital  Outcome: Progressing Towards Goal  Goal: Interventions  Outcome: Progressing Towards Goal     Problem: Patient Education: Go to Patient Education Activity  Goal: Patient/Family Education  Outcome: Progressing Towards Goal     Problem: Falls - Risk of  Goal: *Absence of Falls  Description: Document Lorri Fall Risk and appropriate interventions in the flowsheet.   Outcome: Progressing Towards Goal  Note: Fall Risk Interventions:  Mobility Interventions: Bed/chair exit alarm, Patient to call before getting OOB, Utilize gait belt for transfers/ambulation    Mentation Interventions: Adequate sleep, hydration, pain control, Bed/chair exit alarm, Door open when patient unattended, Gait belt with transfers/ambulation, Increase mobility, More frequent rounding, Toileting rounds, Update white board    Medication Interventions: Bed/chair exit alarm, Patient to call before getting OOB, Teach patient to arise slowly, Utilize gait belt for transfers/ambulation    Elimination Interventions: Bed/chair exit alarm, Call light in reach, Patient to call for help with toileting needs, Toileting schedule/hourly rounds    History of Falls Interventions: Bed/chair exit alarm, Door open when patient unattended, Utilize gait belt for transfer/ambulation         Problem: Patient Education: Go to Patient Education Activity  Goal: Patient/Family Education  Outcome: Progressing Towards Goal

## 2020-12-01 LAB
ALBUMIN SERPL-MCNC: 2.9 G/DL (ref 3.5–5)
ALBUMIN/GLOB SERPL: 0.8 {RATIO} (ref 1.1–2.2)
ALP SERPL-CCNC: 68 U/L (ref 45–117)
ALT SERPL-CCNC: 20 U/L (ref 12–78)
ANION GAP SERPL CALC-SCNC: 4 MMOL/L (ref 5–15)
AST SERPL-CCNC: 34 U/L (ref 15–37)
BILIRUB SERPL-MCNC: 0.4 MG/DL (ref 0.2–1)
BUN SERPL-MCNC: 16 MG/DL (ref 6–20)
BUN/CREAT SERPL: 23 (ref 12–20)
CALCIUM SERPL-MCNC: 9 MG/DL (ref 8.5–10.1)
CHLORIDE SERPL-SCNC: 108 MMOL/L (ref 97–108)
CO2 SERPL-SCNC: 25 MMOL/L (ref 21–32)
CREAT SERPL-MCNC: 0.71 MG/DL (ref 0.55–1.02)
ERYTHROCYTE [DISTWIDTH] IN BLOOD BY AUTOMATED COUNT: 12.5 % (ref 11.5–14.5)
GLOBULIN SER CALC-MCNC: 3.8 G/DL (ref 2–4)
GLUCOSE SERPL-MCNC: 117 MG/DL (ref 65–100)
HCT VFR BLD AUTO: 36.4 % (ref 35–47)
HGB BLD-MCNC: 12.1 G/DL (ref 11.5–16)
MCH RBC QN AUTO: 32.4 PG (ref 26–34)
MCHC RBC AUTO-ENTMCNC: 33.2 G/DL (ref 30–36.5)
MCV RBC AUTO: 97.3 FL (ref 80–99)
NRBC # BLD: 0 K/UL (ref 0–0.01)
NRBC BLD-RTO: 0 PER 100 WBC
PLATELET # BLD AUTO: 179 K/UL (ref 150–400)
PMV BLD AUTO: 11.1 FL (ref 8.9–12.9)
POTASSIUM SERPL-SCNC: 3.9 MMOL/L (ref 3.5–5.1)
PROT SERPL-MCNC: 6.7 G/DL (ref 6.4–8.2)
RBC # BLD AUTO: 3.74 M/UL (ref 3.8–5.2)
SODIUM SERPL-SCNC: 137 MMOL/L (ref 136–145)
WBC # BLD AUTO: 10.3 K/UL (ref 3.6–11)

## 2020-12-01 PROCEDURE — 74011250636 HC RX REV CODE- 250/636: Performed by: INTERNAL MEDICINE

## 2020-12-01 PROCEDURE — 80053 COMPREHEN METABOLIC PANEL: CPT

## 2020-12-01 PROCEDURE — 85027 COMPLETE CBC AUTOMATED: CPT

## 2020-12-01 PROCEDURE — 97530 THERAPEUTIC ACTIVITIES: CPT

## 2020-12-01 PROCEDURE — 74011250637 HC RX REV CODE- 250/637: Performed by: NURSE PRACTITIONER

## 2020-12-01 PROCEDURE — 97535 SELF CARE MNGMENT TRAINING: CPT

## 2020-12-01 PROCEDURE — 36415 COLL VENOUS BLD VENIPUNCTURE: CPT

## 2020-12-01 PROCEDURE — 74011250637 HC RX REV CODE- 250/637: Performed by: INTERNAL MEDICINE

## 2020-12-01 PROCEDURE — 65660000000 HC RM CCU STEPDOWN

## 2020-12-01 RX ORDER — LEVETIRACETAM 500 MG/1
500 TABLET ORAL 2 TIMES DAILY
Status: DISCONTINUED | OUTPATIENT
Start: 2020-12-01 | End: 2020-12-03 | Stop reason: HOSPADM

## 2020-12-01 RX ORDER — AMLODIPINE BESYLATE 5 MG/1
5 TABLET ORAL ONCE
Status: DISCONTINUED | OUTPATIENT
Start: 2020-12-01 | End: 2020-12-01

## 2020-12-01 RX ORDER — AMLODIPINE BESYLATE 5 MG/1
5 TABLET ORAL DAILY
Status: DISCONTINUED | OUTPATIENT
Start: 2020-12-02 | End: 2020-12-03 | Stop reason: HOSPADM

## 2020-12-01 RX ORDER — GUAIFENESIN 100 MG/5ML
81 LIQUID (ML) ORAL DAILY
Status: DISCONTINUED | OUTPATIENT
Start: 2020-12-01 | End: 2020-12-03 | Stop reason: HOSPADM

## 2020-12-01 RX ORDER — AMLODIPINE BESYLATE 5 MG/1
10 TABLET ORAL DAILY
Status: DISCONTINUED | OUTPATIENT
Start: 2020-12-02 | End: 2020-12-01

## 2020-12-01 RX ADMIN — HEPARIN SODIUM 5000 UNITS: 5000 INJECTION INTRAVENOUS; SUBCUTANEOUS at 21:42

## 2020-12-01 RX ADMIN — HEPARIN SODIUM 5000 UNITS: 5000 INJECTION INTRAVENOUS; SUBCUTANEOUS at 08:41

## 2020-12-01 RX ADMIN — ASPIRIN 81 MG CHEWABLE TABLET 81 MG: 81 TABLET CHEWABLE at 08:41

## 2020-12-01 RX ADMIN — AMLODIPINE BESYLATE 5 MG: 5 TABLET ORAL at 08:41

## 2020-12-01 RX ADMIN — LEVETIRACETAM 500 MG: 500 TABLET ORAL at 08:41

## 2020-12-01 RX ADMIN — LEVETIRACETAM 500 MG: 500 TABLET ORAL at 17:16

## 2020-12-01 RX ADMIN — Medication 3 MG: at 21:42

## 2020-12-01 RX ADMIN — ATORVASTATIN CALCIUM 40 MG: 40 TABLET, FILM COATED ORAL at 21:42

## 2020-12-01 RX ADMIN — LISINOPRIL 10 MG: 5 TABLET ORAL at 08:41

## 2020-12-01 NOTE — PROGRESS NOTES
Hospitalist Progress Note    NAME: Chari Mejia   :  1923   MRN:  [id number]     I reviewed pertinent labs and imaging, and discussed /agreed on the plan of care with Dr. Carey Vanessa.  Spoke at length with patient's son Ronni Manjarrez. He has some concerns about Rehab due to [place] pandemic. Assessment / Plan:  Intractable Seizure (Witnessed by her son and in the ED) (Resolved)/Confusion POA   Possible PRESS vs seizure   Her Son states she had a seizure about 10 years ago with similar symptoms. She was prescribed medication but had refused to take it. The past 3 days she has refused her blood pressure medications as well. She has not had any seizure activity up until now.   Appreciate Neurology input  Per RN she was agitated/confused  this am and was given ativan IV  At time of exam patient was resting comfortably without signs/symptoms of distress.   CT of head 2020: IMPRESSION:   Moderate chronic small vessel ischemic disease. No acute intracranial  Abnormality  MRI 2020: IMPRESSION:  1.  Sequela of cerebral atrophy and chronic microvascular ischemic disease but  no evidence of mass, edema, or hemorrhage. 2.  No acute infarct.    Patient tolerating dysphagia diet without difficult/Assist with meals  Appreciate Neurology input  Speech input appreciated  Urinalysis unremarkable  Urine culture negative   Baseline Mentation  Dysphagia diet  Possible undiagnosed Dementia  Hypertension POA   Continue Norvasc 5 mg daily   Lisinopril daily   Hydralazine as needed   Improving    Monitor  Hypokalemia   D5 0.9% NS with 40 KCL at 50 ml/hr   Monitor  Weakness/Frequent Falls POA   PT/OT recommend SNF      PCP: Bella Doran MD     Per patient's son she uses a quad cane and shuffles to ambulate, she has been talking and ambulating up until early this am. Her son states she has been incontinent of urine at times.      Per patient's son the patient has had frequent falls at home.     Ronni Manjarrez (son) [phone]      less than 18.5 Underweight / Body mass index is 17.33 kg/m². Code status: Full  Prophylaxis: Hep SQ  Recommended Disposition: TBD     Subjective:     Chief Complaint / Reason for Physician Visit  Patient alert, No agitation noted. Remains weak. PT/OT evaluation. Discussed with RN events overnight. Review of Systems:  Symptom Y/N Comments  Symptom Y/N Comments   Fever/Chills n   Chest Pain n    Poor Appetite n   Edema n    Cough n   Abdominal Pain     Sputum n   Joint Pain     SOB/VAUGHAN n   Pruritis/Rash n    Nausea/vomit n   Tolerating PT/OT     Diarrhea n   Tolerating Diet y    Constipation n   Other       Could NOT obtain due to:      Objective:     VITALS:   Last 24hrs VS reviewed since prior progress note. Most recent are:  Patient Vitals for the past 24 hrs:   Temp Pulse Resp BP SpO2   11/30/20 1535 98.2 °F (36.8 °C) (!) 52 16 134/68 96 %   11/30/20 1212 97.9 °F (36.6 °C) 88 18 119/65 94 %   11/30/20 0849 97.4 °F (36.3 °C) 91 14 (!) 170/83 96 %   11/30/20 0339 97.6 °F (36.4 °C) 90 16 (!) 172/62 97 %   11/29/20 2313 97.9 °F (36.6 °C) 88 16 (!) 175/68 94 %       Intake/Output Summary (Last 24 hours) at 11/30/2020 2004  Last data filed at 11/30/2020 3504  Gross per 24 hour   Intake 700 ml   Output    Net 700 ml        I had a face to face encounter and independently examined this patient on 11/30/2020, as outlined below:  PHYSICAL EXAM:  General:  Alert, cooperative, no acute distress, thin, Tanana   EENT:   Anicteric sclerae. normocephalic  Resp:  CTA bilaterally, no wheezing or rales. No accessory muscle use  CV:  Regular  rhythm,  No edema  GI:  Soft, Non distended, Non tender. +Bowel sounds  Neurologic:  Alert and oriented to self, normal speech,   Psych:   Poor insight. Not anxious nor agitated  Skin:  No rashes.   No jaundice    Reviewed most current lab test results and cultures  YES  Reviewed most current radiology test results   YES  Review and summation of old records today NO  Reviewed patient's current orders and MAR    YES  PMH/SH reviewed - no change compared to H&P  ________________________________________________________________________  Care Plan discussed with:    Comments   Patient y    Family  y Son   RN y    Care Manager     Consultant                        Multidiciplinary team rounds were held today with , nursing, pharmacist and clinical coordinator. Patient's plan of care was discussed; medications were reviewed and discharge planning was addressed. ________________________________________________________________________    ________________________________________________________________________  Luz Munoz NP     Procedures: see electronic medical records for all procedures/Xrays and details which were not copied into this note but were reviewed prior to creation of Plan. LABS:  I reviewed today's most current labs and imaging studies.   Pertinent labs include:  Recent Labs     11/30/20 0024 11/29/20 0223 11/28/20 0225   WBC 7.4 7.8 6.9   HGB 11.3* 11.5 10.4*   HCT 34.6* 34.7* 31.5*    167 176     Recent Labs     11/30/20 0024 11/29/20 0223 11/28/20 0225    142 139   K 3.7 3.3* 3.2*   * 111* 109*   CO2 25 24 25   * 81 94   BUN 10 12 14   CREA 0.54* 0.56 0.65   CA 9.1 9.2 8.4*   MG 1.9 1.5*  --    ALB 2.9* 3.0*  --    TBILI 0.4 0.6  --    ALT 19 18  --        Signed: Luz Munoz NP

## 2020-12-01 NOTE — PROGRESS NOTES
Problem: Self Care Deficits Care Plan (Adult)  Goal: *Acute Goals and Plan of Care (Insert Text)  Description:   FUNCTIONAL STATUS PRIOR TO ADMISSION: 79 yo lives with son; he reports he assisted with self care tasks; patient reports she walks with quad cane and son stands by for all ADL and mobility tasks; son has requested w/c from  as he plans on taking her home rather than SNF, due to covid. She will need w/c in the home. She reports use of TTB and HHS PTA as well as grab bars by the commode which she was using with SBA per patient. HOME SUPPORT: lives with retired son who will need to clarify her previous level of care to maximize safe discharge planning    Occupational Therapy Goals  Initiated 11/30/2020  1. Patient will perform self-feeding with modified independence within 7 day(s). 2.  Patient will perform grooming with supervision/set-up within 7 day(s). 3.  Patient will perform upper body dressing and lower body dressing with minimal assistance/contact guard assist within 7 day(s). 4.  Patient will perform toilet transfers with moderate assistance  within 7 day(s). 5.  Patient will perform all aspects of toileting with moderate assistance  within 7 day(s). 6.  Patient will participate in upper extremity therapeutic exercise/activities with minimal assistance/contact guard assist for 5 minutes within 7 day(s). 7.  Patient will utilize energy conservation, pain management, fall prevention techniques during functional activities with verbal, visual, and tactile cues within 7 day(s). Outcome: Progressing Towards Goal     OCCUPATIONAL THERAPY TREATMENT  Patient: Pallavi Maurer (69 y.o. female)  Date: 12/1/2020  Diagnosis: Intractable seizures ([facility] [address] Angelique De Anda   <principal problem not specified>       Precautions: Fall, Bed Alarm, Skin  Chart, occupational therapy assessment, plan of care, and goals were reviewed.     ASSESSMENT  Patient continues with skilled OT services and is slowly progressing towards goals. Patient received supine in bed, agreeable to session. Although patient continues to require significant 1-2 person assist for bed mobility, patient with fair-good balance once EOB. With encouragement, patient able to grasp comb at midline and manipulated in-hand to orient correctly. Patient able to tolerate sitting EOB to comb hair with CGA for balance and occasional tactile/verbal cues needed to sustain midline. After several minutes, noted pain with declining responsiveness prompting return to supine with total assist. Patient BP elevated with slight decrease in SBP upon return to supine, however then remaining stable - RN notified of BP and patient presentation, patient still reporting dizziness and feeling generally unwell. Patient incontinent of urine, requiring max assist to roll and total assist for hygiene and linen management. Assisted patient to reposition for comfort with patient then requesting drink. Patient answering questions appropriately but continued with decreased verbalizations and initiation, making no attempt to reach for offered Ensure bottle. In fully upright position, max assist required to facilitate/sustain grasp and bring to mouth to drink. Repeatedly cued patient for small sips, but coughing noted x2. RN aware and monitoring patient. Current Level of Function Impacting Discharge (ADLs): CGA-total    Other factors to consider for discharge: unable to stand despite attempt with 2 persons         PLAN :  Patient continues to benefit from skilled intervention to address the above impairments. Continue treatment per established plan of care. to address goals. Recommend with staff: encourage max participation in ADLs as able    Recommendation for discharge: (in order for the patient to meet his/her long term goals)  Therapy up to 5 days/week in SNF setting; If returns home, will need heavy physical assist available 24/7 for all mobility and self-care. This discharge recommendation:  Has been made in collaboration with the attending provider and/or case management    IF patient discharges home will need the following DME: bedside commode, gait belt, hospital bed, mechanical lift, transfer bench, and wheelchair       SUBJECTIVE:   Patient stated I don't feel very good.     OBJECTIVE DATA SUMMARY:   Cognitive/Behavioral Status:  Neurologic State: Alert;Confused  Orientation Level: Oriented to person  Cognition: Follows commands;Decreased attention/concentration  Perception: Appears intact  Perseveration: No perseveration noted  Safety/Judgement: Decreased awareness of need for safety;Decreased awareness of environment; Fall prevention    Functional Mobility and Transfers for ADLs:  Bed Mobility:  Rolling: Maximum assistance  Supine to Sit: Maximum assistance;Assist x2  Sit to Supine: Total assistance  Scooting: Maximum assistance;Assist x2    Transfers:  Sit to Stand: Total assistance  Functional Transfers  Toilet Transfer : Maximum assistance     Balance:  Sitting: Impaired  Sitting - Static: Fair (occasional)  Sitting - Dynamic: Fair (occasional)    ADL Intervention:  Feeding  Feeding Assistance: Maximum assistance  Drink to Mouth: Maximum assistance  Cues: Verbal cues provided;Visual cues provided; Tactile cues provided;Physical assistance    Grooming  Grooming Assistance: Set-up; Contact guard assistance  Position Performed: Seated edge of bed  Brushing/Combing Hair: Contact guard assistance  Cues: Verbal cues provided;Visual cues provided    Lower Body Dressing Assistance  Dressing Assistance: Total assistance(dependent)  Protective Undergarmet: Total assistance (dependent)    Toileting  Toileting Assistance: Total assistance(dependent)  Bladder Hygiene: Total assistance (dependent)  Clothing Management: Total assistance (dependent)  Cues: Verbal cues provided;Visual cues provided; Tactile cues provided    Cognitive Retraining  Orientation Retraining: Reorienting; Awareness of environment;Situation  Organizing/Sequencing: Breaking task down  Safety/Judgement: Decreased awareness of need for safety;Decreased awareness of environment; Fall prevention    Pain:  No pain reported. Activity Tolerance:   Fair and requires frequent rest breaks    After treatment patient left in no apparent distress:   Supine in bed, Call bell within reach, Bed / chair alarm activated, and Side rails x 3    COMMUNICATION/COLLABORATION:   The patients plan of care was discussed with: Physical therapist and Registered nurse.      Mayra Montano OT  Time Calculation: 35 mins

## 2020-12-01 NOTE — PROGRESS NOTES
Problem: Mobility Impaired (Adult and Pediatric)  Goal: *Acute Goals and Plan of Care (Insert Text)  Description:   FUNCTIONAL STATUS PRIOR TO ADMISSION: Patient was sba using a SB quad cane for functional mobility. Recent falls in to last 2 weeks. HOME SUPPORT PRIOR TO ADMISSION: The patient lived alone with son to provide assistance. Physical Therapy Goals  Initiated 11/30/2020  1. Patient will move from supine to sit and sit to supine  and roll side to side in bed with minimal assistance/contact guard assist within 7 day(s). 2.  Patient will transfer from bed to chair and chair to bed with moderate assistance  using the least restrictive device within 7 day(s). 3.  Patient will perform sit to stand with moderate assistance  within 7 day(s). 4.  Patient will ambulate with moderate assistance  for 15 feet with the least restrictive device within 7 day(s). 5.  Patient will improve Rosa Balance score by 7 points within 7 days. Outcome: Not Progressing Towards Goal    PHYSICAL THERAPY TREATMENT  Patient: Pallavi Maurer (51 y.o. female)  Date: 12/1/2020  Diagnosis: Intractable seizures ([facility] [address] Angelique De Anda   <principal problem not specified>      Precautions: Fall, Bed Alarm, Skin  Chart, physical therapy assessment, plan of care and goals were reviewed. ASSESSMENT  Patient continues with skilled PT services and is not progressing towards goals. Pt was received in supine and cleared by nursing to mobilize. She was able to tolerate coming to the EOB with fair sitting balance. Attempted to stand without success of 2 person. Noted she was saturated. She was returned to supine and worked on rolling to change brief and pads. Pt reported she was dizzy, OT took BP. Nursing made aware.      Current Level of Function Impacting Discharge (mobility/balance): max/total    Other factors to consider for discharge:          PLAN :  Patient continues to benefit from skilled intervention to address the above impairments. Continue treatment per established plan of care. to address goals. Recommendation for discharge: (in order for the patient to meet his/her long term goals)  Therapy up to 5 days/week in SNF setting. Family wants to take home    This discharge recommendation:  Has not yet been discussed the attending provider and/or case management    IF patient discharges home will need the following DME: hospital bed, wheelchair       SUBJECTIVE:   Patient stated Aguilar Running feel dizzy.     OBJECTIVE DATA SUMMARY:   Critical Behavior:  Neurologic State: Alert, Confused  Orientation Level: Oriented to person  Cognition: Decreased attention/concentration, Follows commands     Functional Mobility Training:  Bed Mobility:  Rolling: Maximum assistance;Assist x2  Supine to Sit: Maximum assistance;Assist x2     Scooting: Maximum assistance        Transfers:  Sit to Stand: Total assistance  Stand to Sit: Total assistance                             Balance:  Sitting: Impaired  Sitting - Static: Fair (occasional)  Sitting - Dynamic: Fair (occasional)    Activity Tolerance:   Fair    After treatment patient left in no apparent distress:   Supine in bed, Call bell within reach, and Bed / chair alarm activated    COMMUNICATION/COLLABORATION:   The patients plan of care was discussed with: Occupational therapist and Registered nurse.      Emilie Brush, PT, DPT   Time Calculation: 23 mins

## 2020-12-01 NOTE — PROGRESS NOTES
Problem: Pressure Injury - Risk of  Goal: *Prevention of pressure injury  Description: Document Danilo Scale and appropriate interventions in the flowsheet. Outcome: Progressing Towards Goal  Note: Pressure Injury Interventions:  Sensory Interventions: Assess changes in LOC, Assess need for specialty bed, Float heels, Keep linens dry and wrinkle-free    Moisture Interventions: Absorbent underpads, Apply protective barrier, creams and emollients, Internal/External urinary devices, Limit adult briefs    Activity Interventions: Assess need for specialty bed, Increase time out of bed, Pressure redistribution bed/mattress(bed type)    Mobility Interventions: Assess need for specialty bed, Pressure redistribution bed/mattress (bed type)    Nutrition Interventions: Offer support with meals,snacks and hydration    Friction and Shear Interventions: Apply protective barrier, creams and emollients, Minimize layers                Problem: Patient Education: Go to Patient Education Activity  Goal: Patient/Family Education  Outcome: Progressing Towards Goal     Problem: Seizure Disorder (Adult)  Goal: *STG: Remains free of seizure activity  Outcome: Progressing Towards Goal  Goal: *STG: Maintains lab values within therapeutic range  Outcome: Progressing Towards Goal  Goal: *STG/LTG: Complies with medication therapy  Outcome: Progressing Towards Goal  Goal: *STG: Remains free of injury during seizure activity  Outcome: Progressing Towards Goal  Goal: *STG: Remains safe in hospital  Outcome: Progressing Towards Goal  Goal: Interventions  Outcome: Progressing Towards Goal     Problem: Patient Education: Go to Patient Education Activity  Goal: Patient/Family Education  Outcome: Progressing Towards Goal     Problem: Falls - Risk of  Goal: *Absence of Falls  Description: Document Lorri Fall Risk and appropriate interventions in the flowsheet.   Outcome: Progressing Towards Goal  Note: Fall Risk Interventions:  Mobility Interventions: Bed/chair exit alarm, Patient to call before getting OOB, Utilize gait belt for transfers/ambulation    Mentation Interventions: Adequate sleep, hydration, pain control, Bed/chair exit alarm, Door open when patient unattended, Gait belt with transfers/ambulation, Increase mobility, More frequent rounding, Reorient patient, Toileting rounds, Update white board    Medication Interventions: Bed/chair exit alarm, Patient to call before getting OOB, Teach patient to arise slowly, Utilize gait belt for transfers/ambulation    Elimination Interventions: Bed/chair exit alarm, Call light in reach, Patient to call for help with toileting needs, Toileting schedule/hourly rounds    History of Falls Interventions: Bed/chair exit alarm, Door open when patient unattended, Utilize gait belt for transfer/ambulation         Problem: Patient Education: Go to Patient Education Activity  Goal: Patient/Family Education  Outcome: Progressing Towards Goal

## 2020-12-01 NOTE — PROGRESS NOTES
Repeat BP improved prior to receiving additional amlodipine. Will d/c additional amlodipine and decrease daily dose back to 5 mg.

## 2020-12-01 NOTE — PROGRESS NOTES
End of Shift Note    Bedside shift change report given to Yuko Cat (oncoming nurse) by Anibal Serrano (offgoing nurse). Report included the following information SBAR, MAR and Recent Results    Shift worked:  2208-3484   Shift summary and any significant changes:    none       Concerns for physician to address:  none   Zone phone for oncoming shift:   [phone]     Patient Information  Chari Mejia  80 y.o.  11/27/2020  1:30 AM by Es Baker MD. Chari Mejia was admitted from Home    Problem List  Patient Active Problem List    Diagnosis Date Noted    Intractable seizures ([address] 11/27/2020    Hypokalemia 11/27/2020    HTN (hypertension) 11/27/2020    Acute encephalopathy 11/27/2020     Past Medical History:   Diagnosis Date    Hypertension     [facility]     Seizures ([facility]        Core Measures:  CVA: No No  CHF:No No  PNA:No No    Activity:  Activity Level: Bed Rest  Number times ambulated in hallways past shift: 0  Number of times OOB to chair past shift: 1    Cardiac:   Cardiac Monitoring: Yes      Cardiac Rhythm: Normal sinus rhythm    Access:   Current line(s): PIV       Genitourinary:   Urinary status: incontinent       Respiratory:   O2 Device: Room air  Chronic home O2 use?: NO  Incentive spirometer at bedside: NO       GI:  Last Bowel Movement Date: 11/29/20  Current diet:  DIET DYSPHAGIA ADVANCED SOFT (NDD3)  DIET NUTRITIONAL SUPPLEMENTS Breakfast, Dinner, Lunch; Ensure Enlive  Passing flatus: YES  Tolerating current diet: NPO       Pain Management:   Patient states pain is manageable on current regimen: N/A    Skin:  Danilo Score: 12  Interventions: limit briefs and internal/external urinary devices    Patient Safety:  Fall Score:  Total Score: 4  Interventions: bed/chair alarm, gripper socks, pt to call before getting OOB, stay with me (per policy) and gait belt  High Fall Risk: Yes    DVT prophylaxis:  DVT prophylaxis Med- Yes  DVT prophylaxis SCD or TERRY- No     Wounds: (If Applicable)  Wounds- No  Location     Active Consults:  IP CONSULT TO NEUROLOGY  IP CONSULT TO PALLIATIVE CARE - PROVIDER    Length of Stay:  Expected LOS: 2d 16h  Actual LOS: 4  Discharge Plan: Yes, home with home health       [facility]

## 2020-12-01 NOTE — PROGRESS NOTES
Hospitalist Progress Note    NAME: Lucille Hill   :  1923   MRN:  [id number]     I reviewed pertinent labs/imaging and discussed plan of care with Dr. Jocelin Morley who is in agreement. Assessment / Plan:  Intractable seizure  Generalized weakness  Frequent falls    AMS  CT head 20: Moderate chronic small vessel ischemic disease. No acute intracranial abnormality. MRI brain 20:  1. Sequela of cerebral atrophy and chronic microvascular ischemic disease but no evidence of mass, edema, or hemorrhage. 2.  No acute infarct. - Neuro input appreciated. - Speech input appreciated. - According to son patient had seizure 10 years ago with similar symptoms for which she was prescribed medication but refused to take. - No further seizure activity. - Continue levetiracetam 500 mg po bid. HTN  Dyslipidemia   - BP on the high side. - Continue asa 81 mg po daily.    - Increase amlodipine to 10 mg po daily. - Continue lisinopril 10 mg po daily, may need titration.    - Continue atorvastatin 40 mg po daily. - Continue hydralazine prn. Mild hyperglycemia   - No tx indicated. - Monitor with a.m. labs. Protein calorie malnutrition  - Continue nutritional supplements. Hypokalemia, resolved   - Monitor. less than 18.5 Underweight / Body mass index is 17.33 kg/m². Code status: Full  Prophylaxis: Hep SQ  Recommended Disposition: Home w/Family     Subjective:     Chief Complaint / Reason for Physician Visit  Patient mumbles answers to questions. Able to state that Bruna Sanchez is her son. Plan of care and pertinent events reviewed with bedside nurse.      Review of Systems:  Symptom Y/N Comments  Symptom Y/N Comments   Fever/Chills    Chest Pain     Poor Appetite    Edema     Cough    Abdominal Pain     Sputum    Joint Pain     SOB/VAUGHAN    Pruritis/Rash     Nausea/vomit    Tolerating PT/OT     Diarrhea    Tolerating Diet     Constipation    Other       Could NOT obtain due to:      Objective:     VITALS:   Last 24hrs VS reviewed since prior progress note. Most recent are:  Patient Vitals for the past 24 hrs:   Temp Pulse Resp BP SpO2   12/01/20 1115  97  (!) 152/81    12/01/20 1113    (!) 142/76    12/01/20 1109  95  (!) 154/78 96 %   12/01/20 1100  94  (!) 165/80 98 %   12/01/20 0841 98.3 °F (36.8 °C) 99 16 (!) 178/81 96 %   12/01/20 0411 99.2 °F (37.3 °C) 96 16 (!) 144/59 94 %   11/30/20 2352 98.9 °F (37.2 °C) 100 16 (!) 155/71 92 %   11/30/20 2039 98.7 °F (37.1 °C) 94 16 (!) 151/56 94 %   11/30/20 1535 98.2 °F (36.8 °C) (!) 52 16 134/68 96 %     No intake or output data in the 24 hours ending 12/01/20 1228     I had a face to face encounter and independently examined this patient on 12/1/2020, as outlined below:  PHYSICAL EXAM:  General:  Awake. Mumbles answers to questions. NAD. HEENT:  Normocephalic. Sclera anicteric. Mucous membranes moist.    Chest:  Resps even/unlabored with symmetrical CWE. Air entry diminished at bases [place]. Lungs CTA. No use of accessory muscles. CV:  RRR. Normal S1/S2. No M/C/R appreciated. No JVD. No peripheral edema. GI:  Abdomen soft/NT/ND. No organomegaly. No hernia. ABT X 4.    :  Voiding. Neurologic:  Generally weak and deconditioned. No seizure activity. Face symmetrical.  Psych:  Cooperative. No anxiety or agitation. Skin:  No rashes or jaundice. Reviewed most current lab test results and cultures  YES  Reviewed most current radiology test results   YES  Review and summation of old records today    NO  Reviewed patient's current orders and MAR    YES  PMH/SH reviewed - no change compared to H&P  ________________________________________________________________________  Care Plan discussed with:    Comments   Patient [address]     Consultant                        Multidiciplinary team rounds were held today with , nursing, pharmacist and clinical coordinator. Patient's plan of care was discussed; medications were reviewed and discharge planning was addressed. ________________________________________________________________________  Ismael Banuelos NP     Procedures: see electronic medical records for all procedures/Xrays and details which were not copied into this note but were reviewed prior to creation of Plan. LABS:  I reviewed today's most current labs and imaging studies.   Pertinent labs include:  Recent Labs     12/01/20  0208 11/30/20  0024 11/29/20  0223   WBC 10.3 7.4 7.8   HGB 12.1 11.3* 11.5   HCT 36.4 34.6* 34.7*    164 167     Recent Labs     12/01/20  0208 11/30/20  0024 11/29/20  0223    143 142   K 3.9 3.7 3.3*    112* 111*   CO2 25 25 24   * 110* 81   BUN 16 10 12   CREA 0.71 0.54* 0.56   CA 9.0 9.1 9.2   MG  --  1.9 1.5*   ALB 2.9* 2.9* 3.0*   TBILI 0.4 0.4 0.6   ALT 20 19 18       Signed: Ismael Banuelos NP

## 2020-12-01 NOTE — PROGRESS NOTES
RAS:    Wheelchair    UPDATE: 3:56PM    CM informed that pt was approved for wheelchair. CM informed that pt's wheelchair will be delivered to pt's home, on 12/1/20. CM will continue to follow. NAZANIN Sanderson, [place]        UPDATE: 12:38PM    MAIKEL sent signed clinicals to [address] [place] [address] for wheelchair. Pt's wheelchair will be delivered to pt's home if Sergei Hargrove is accepted. CM informed pt's son: Olman Hair that will chair can take 24-48hrs, to deliver DME. CM will continue to follow. NAZANIN Sanderson,           CM sent clinicals to DME provider: [address] [place] [address], for wheelchair. CM is waiting for approval from DME. Once approved wheelchair will be delivered to pt's home. CM will continue to follow.     NAZANIN Sanderson, [address] [place] [address]

## 2020-12-02 LAB
ANION GAP SERPL CALC-SCNC: 8 MMOL/L (ref 5–15)
BUN SERPL-MCNC: 16 MG/DL (ref 6–20)
BUN/CREAT SERPL: 27 (ref 12–20)
CALCIUM SERPL-MCNC: 9.2 MG/DL (ref 8.5–10.1)
CHLORIDE SERPL-SCNC: 106 MMOL/L (ref 97–108)
CO2 SERPL-SCNC: 25 MMOL/L (ref 21–32)
CREAT SERPL-MCNC: 0.59 MG/DL (ref 0.55–1.02)
ERYTHROCYTE [DISTWIDTH] IN BLOOD BY AUTOMATED COUNT: 12.5 % (ref 11.5–14.5)
GLUCOSE SERPL-MCNC: 86 MG/DL (ref 65–100)
HCT VFR BLD AUTO: 34.7 % (ref 35–47)
HGB BLD-MCNC: 11.5 G/DL (ref 11.5–16)
MAGNESIUM SERPL-MCNC: 1.8 MG/DL (ref 1.6–2.4)
MCH RBC QN AUTO: 31.9 PG (ref 26–34)
MCHC RBC AUTO-ENTMCNC: 33.1 G/DL (ref 30–36.5)
MCV RBC AUTO: 96.4 FL (ref 80–99)
NRBC # BLD: 0 K/UL (ref 0–0.01)
NRBC BLD-RTO: 0 PER 100 WBC
PHOSPHATE SERPL-MCNC: 3.6 MG/DL (ref 2.6–4.7)
PLATELET # BLD AUTO: 170 K/UL (ref 150–400)
PMV BLD AUTO: 10.7 FL (ref 8.9–12.9)
POTASSIUM SERPL-SCNC: 3.6 MMOL/L (ref 3.5–5.1)
RBC # BLD AUTO: 3.6 M/UL (ref 3.8–5.2)
SODIUM SERPL-SCNC: 139 MMOL/L (ref 136–145)
WBC # BLD AUTO: 11.2 K/UL (ref 3.6–11)

## 2020-12-02 PROCEDURE — 36415 COLL VENOUS BLD VENIPUNCTURE: CPT

## 2020-12-02 PROCEDURE — 74011250637 HC RX REV CODE- 250/637: Performed by: NURSE PRACTITIONER

## 2020-12-02 PROCEDURE — 80048 BASIC METABOLIC PNL TOTAL CA: CPT

## 2020-12-02 PROCEDURE — 83735 ASSAY OF MAGNESIUM: CPT

## 2020-12-02 PROCEDURE — 74011250636 HC RX REV CODE- 250/636: Performed by: INTERNAL MEDICINE

## 2020-12-02 PROCEDURE — 85027 COMPLETE CBC AUTOMATED: CPT

## 2020-12-02 PROCEDURE — 84100 ASSAY OF PHOSPHORUS: CPT

## 2020-12-02 PROCEDURE — 65660000000 HC RM CCU STEPDOWN

## 2020-12-02 PROCEDURE — 74011250637 HC RX REV CODE- 250/637: Performed by: INTERNAL MEDICINE

## 2020-12-02 RX ORDER — LISINOPRIL 10 MG/1
10 TABLET ORAL DAILY
Qty: 30 TAB | Refills: 0 | Status: SHIPPED | OUTPATIENT
Start: 2020-12-03

## 2020-12-02 RX ORDER — ATORVASTATIN CALCIUM 40 MG/1
40 TABLET, FILM COATED ORAL
Qty: 30 TAB | Refills: 0 | Status: SHIPPED | OUTPATIENT
Start: 2020-12-02

## 2020-12-02 RX ORDER — LEVETIRACETAM 500 MG/1
500 TABLET ORAL 2 TIMES DAILY
Qty: 60 TAB | Refills: 0 | Status: SHIPPED | OUTPATIENT
Start: 2020-12-02

## 2020-12-02 RX ORDER — ACETAMINOPHEN 325 MG/1
650 TABLET ORAL
Qty: 50 TAB | Refills: 0 | Status: SHIPPED | OUTPATIENT
Start: 2020-12-02

## 2020-12-02 RX ORDER — AMLODIPINE BESYLATE 5 MG/1
5 TABLET ORAL DAILY
Qty: 30 TAB | Refills: 0 | Status: SHIPPED | OUTPATIENT
Start: 2020-12-03

## 2020-12-02 RX ADMIN — ATORVASTATIN CALCIUM 40 MG: 40 TABLET, FILM COATED ORAL at 21:57

## 2020-12-02 RX ADMIN — Medication 3 MG: at 21:57

## 2020-12-02 RX ADMIN — HEPARIN SODIUM 5000 UNITS: 5000 INJECTION INTRAVENOUS; SUBCUTANEOUS at 21:57

## 2020-12-02 RX ADMIN — HEPARIN SODIUM 5000 UNITS: 5000 INJECTION INTRAVENOUS; SUBCUTANEOUS at 10:52

## 2020-12-02 RX ADMIN — LISINOPRIL 10 MG: 5 TABLET ORAL at 10:52

## 2020-12-02 RX ADMIN — LEVETIRACETAM 500 MG: 500 TABLET ORAL at 10:52

## 2020-12-02 RX ADMIN — ASPIRIN 81 MG CHEWABLE TABLET 81 MG: 81 TABLET CHEWABLE at 10:52

## 2020-12-02 RX ADMIN — AMLODIPINE BESYLATE 5 MG: 5 TABLET ORAL at 10:52

## 2020-12-02 NOTE — PROGRESS NOTES
Bedside and Verbal shift change report given to Perry Crawford (oncoming nurse) by Juana Dominguez (offgoing nurse). Report included the following information SBAR, Kardex, MAR and Recent Results.

## 2020-12-02 NOTE — DISCHARGE INSTRUCTIONS
Seizure: Care Instructions  Your Care Instructions     Seizures are caused by abnormal patterns of electrical signals in the brain. They are different for each person. Seizures can affect movement, speech, vision, or awareness. Some people have only slight shaking of a hand and do not pass out. Other people may pass out and have violent shaking of the whole body. Some people appear to stare into space. They are awake, but they can't respond normally. Later, they may not remember what happened. You may need tests to identify the type and cause of the seizures. A seizure may occur only once, or you may have them more than one time. Taking medicines as directed and following up with your doctor may help keep you from having more seizures. The doctor has checked you carefully, but problems can develop later. If you notice any problems or new symptoms, get medical treatment right away. Follow-up care is a key part of your treatment and safety. Be sure to make and go to all appointments, and call your doctor if you are having problems. It's also a good idea to know your test results and keep a list of the medicines you take. How can you care for yourself at home? · Be safe with medicines. Take your medicines exactly as prescribed. Call your doctor if you think you are having a problem with your medicine. · Do not do any activity that could be dangerous to you or others until your doctor says it is safe to do so. For example, do not drive a car, operate machinery, swim, or climb ladders. · Be sure that anyone treating you for any health problem knows that you have had a seizure and what medicines you are taking for it. · Identify and avoid things that may make you more likely to have a seizure. These may include lack of sleep, alcohol or drug use, stress, or not eating. · Make sure you go to your follow-up appointment. When should you call for help? Call 911 anytime you think you may need emergency care.  For example, call if:    · You have another seizure.     · You have new symptoms, such as trouble walking, speaking, or thinking clearly. Call your doctor now or seek immediate medical care if:    · You are not acting normally. Watch closely for changes in your health, and be sure to contact your doctor if you have any problems. Where can you learn more? Go to http://www.gray.com/  Enter M769 in the search box to learn more about \"Seizure: Care Instructions. \"  Current as of: 2019               Content Version: 12.6  © 8441-0266 [employer] Care instructions adapted under license by [employer] (which disclaims liability or warranty for this information). If you have questions about a medical condition or this instruction, always ask your healthcare professional. [address] any warranty or liability for your use of this information. HOSPITALIST DISCHARGE INSTRUCTIONS    NAME: Nick Perry   :  1923   MRN:  [id number]     Date/Time:  2020 1:15 PM    ADMIT DATE: 2020   DISCHARGE DATE: 2020     Attending Physician: Sun Gage NP    DISCHARGE DIAGNOSIS:  Seizure  Generalized weakness  Frequent falls    Altered mental status  Hypertension (elevated blood pressure)  Dyslipidemia (abnormal blood lipids/fats)  Mild hyperglycemia (mildly elevated blood sugar)  Malnutrition (inadequate nutritional intake, I recommend you use nutritional supplements at least 2-3 times daily)  Hypokalemia (low blood potassium which resolved prior to hospital discharge)      Medications: Per above medication reconciliation.     Pain Management: per above medications    Recommended diet: Dysphasia/advanced soft diet    Recommended activity: Activity as tolerated    Wound care: None    Indwelling devices:  None    Supplemental Oxygen: None    Required Lab work: Complete blood count to be drawn by home health and called/faxed to Primary Care Providers office for evaluation    Glucose management:  None    Code status: Full     Take all discharge paperwork with you to all of your follow up doctor appointments. Take your medications exactly as outlined in your discharge paperwork. Do not take any other medications unless specifically prescribed after your hospital stay. If your symptoms return/worsen seek medical attention immediately. Outside physician follow up: Follow-up Information     Follow up With Specialties Details Why Contact Info    Dougie Khan MD Family Medicine   [address] [place] [address] [phone]            Information obtained by :  I understand that if any problems occur once I am at home I am to contact my physician. I understand and acknowledge receipt of the instructions indicated above.                                                                                                                                            Physician's or R.N.'s Signature                                                                  Date/Time                                                                                                                                              Patient or Representative

## 2020-12-02 NOTE — DISCHARGE SUMMARY
Hospitalist Discharge Summary     Patient ID:  Brandon Rush  [id number]  3 y.o.  7/9/1923 11/27/2020    PCP on record: Lyn Oro MD    Admit date: 11/27/2020  Discharge date and time: 12/2/2020    DISCHARGE DIAGNOSIS:  Intractable seizure  Generalized weakness  Frequent falls    AMS, etiology unclear but resolved by discharge  HTN  Dyslipidemia   Mild hyperglycemia   Protein calorie malnutrition  Hypokalemia, resolved     CONSULTATIONS:  IP CONSULT TO NEUROLOGY  IP CONSULT TO PALLIATIVE CARE - PROVIDER    Excerpted HPI from H&P of Vika Borjas MD:  Nanette Senior is a 80 y.o.  female who presents with seizure at home. As per son who I spoke over the phone who lives with the patient claims he sound her unresponsive. In ED pt noted to have recurrent tonic clonic seizures per ED and bedside RN report. Son also reported that only prescription medication she is on is her blood pressure medication which she didn't take for lest couple of days. Pt is sedated under influence of ativan so history is limited. She noted to have high blood pressure in ED. \"    ______________________________________________________________________  DISCHARGE SUMMARY/HOSPITAL COURSE:  for full details see H&P, daily progress notes, labs, consult notes. Rachel Way is a 80-year-old female with PMH significant for HTN and seizures (untreated) who presented from home to the ED after her son found her unresponsive. In the ED the patient was found to have recurrent tonic-clonic seizures. Patient was given IV lorazepam with improvement. A CT of the head was completed which revealed moderate small vessel ischemic disease. A neurology consult was requested and the patient was admitted for further evaluation and treatment. An EEG was completed which revealed generalized slowing. There was no focal asymmetry, seizures, or epileptiform discharges seen.   Additionally, an MRI of the brain was completed which did not reveal any acute infarct (details noted below). Patient was also seen by the neurologist and started on [address]. She was followed throughout her hospitalization by neurology. The patient had no further observed seizure activity. Antihypertensive medications were adjusted and the patient was started on statin therapy. She continued to progress. On 12/02/2020 it was felt that she was in stable condition for discharge to home. A wheelchair was requested to assist the patient to be more mobile. The patient was cleared by neurology for discharge and it was also felt that the patient was medically stable for discharge. All discharge medications and discharge instructions were reviewed with the patient's son over the telephone and he verbalized understanding. When it came time for discharge the patient's son became concerned that he would be unable to care for his mother appropriately because the wheelchair had not arrived and he had not gotten her medications. As a result, her discharge was held until 12/03/2020    On 12/03/2020 after conversation with case management it was determined that the patient's son had received her wheelchair and obtained her home/discharge medications and was ready to have his mother return home. The patient will have home health services. She will need a CBC drawn within 1 week with results called/faxed to her PCP. At the time of discharge the patient had a slight leukocytosis (11.2) however she remained afebrile and nontoxic in appearance. At the time of this dictation the patient's vital signs were as follows:  T 97.7, /60, , RR 16, SpO3 93% on room air. CT head 11/27/20: Moderate chronic small vessel ischemic disease. No acute intracranial abnormality. MRI brain 11/28/20:  1.  Sequela of cerebral atrophy and chronic microvascular ischemic disease but no evidence of mass, edema, or hemorrhage. 2.  No acute infarct. _______________________________________________________________________  Patient seen and examined by me on discharge day. Pertinent Findings:  General:  A/A. Interactive. Reading newspaper. NAD. HEENT:  Normocephalic. Sclera anicteric. Mucous membranes moist.    Chest:  Resps even/unlabored with symmetrical CWE. Air entry diminished at bases. Lungs CTA. No use of accessory muscles. CV:  RRR. Normal S1/S2. No M/C/R appreciated. No JVD. No peripheral edema. GI:  Abdomen soft/NT/ND. No organomegaly. No hernia. ABT X 4.    :  Voiding. Neurologic:  Generally weak and deconditioned. Face symmetrical.  Speech normal.     Psych:  Cooperative. No anxiety or agitation. No seizure activity noted. Skin:  No rashes or jaundice. _______________________________________________________________________  DISCHARGE MEDICATIONS:   Current Discharge Medication List      START taking these medications    Details   acetaminophen (TYLENOL) 325 mg tablet Take 2 Tabs by mouth every four (4) hours as needed for Fever (For temp greater than or equal to 38.5 C or 101.3 F (Unless hepatic failure or contrindicated). Give first line for fever. ). Qty: 50 Tab, Refills: 0      amLODIPine (NORVASC) 5 mg tablet Take 1 Tab by mouth daily. Qty: 30 Tab, Refills: 0      atorvastatin (LIPITOR) 40 mg tablet Take 1 Tab by mouth nightly. Qty: 30 Tab, Refills: 0      levETIRAcetam (KEPPRA) 500 mg tablet Take 1 Tab by mouth two (2) times a day. Qty: 60 Tab, Refills: 0      lisinopriL (PRINIVIL, ZESTRIL) 10 mg tablet Take 1 Tab by mouth daily. Qty: 30 Tab, Refills: 0         CONTINUE these medications which have NOT CHANGED    Details   omega 3-dha-epa-fish oil (FISH OIL) 100-160-1,000 mg cap Take 1 Cap by mouth daily. calcium-cholecalciferol, D3, (CALTRATE 600+D) tablet Take 1 Tab by mouth daily. vitamin P-C-R-lutein-minerals (OCUVITE) tablet Take 1 Tab by mouth daily.       multivitamin (ONE A DAY) tablet Take 1 Tab by mouth daily. aspirin delayed-release 81 mg tablet Take 81 mg by mouth daily. Walker misc 1 Units by Does Not Apply route as needed (unsteady gait). Qty: 1 Each, Refills: 1         STOP taking these medications       acetaminophen (TYLENOL) 500 mg capsule Comments:   Reason for Stopping:         acetaminophen (TYLENOL) 500 mg capsule Comments:   Reason for Stopping:         amLODIPine-benazepril (LOTREL) 5-10 mg per capsule Comments:   Reason for Stopping:         meclizine (ANTIVERT) 25 mg tablet Comments:   Reason for Stopping:                 Patient Follow Up Instructions: Activity: Activity as tolerated  Diet: Dysphasia/advanced soft diet  Wound Care: None needed    Follow-up with as noted below  Follow-up tests/labs: CBC     Follow-up Information     Follow up With Specialties Details Why Contact Oc Wilson MD Family Medicine In 1 week You will need to see your Primary Care Provider within 1 week of hospital discharge and will need a Complete Blood Count 3 [address] [place]   On 12/2/2020 THIS  [facility] [address]   IF YOU DO NOT HEAR FROM THEM WITHIN 24-48HRS, PLEASE CONTACT THEM DIRECTLY [phone]        ________________________________________________________________    Risk of deterioration: Low    Condition at Discharge:  Stable  __________________________________________________________________    Disposition  Home with home health.      ____________________________________________________________________    Code Status: Full Code  ___________________________________________________________________      Total time in minutes spent coordinating this discharge (includes going over instructions, follow-up, prescriptions, and preparing report for sign off to her PCP) :  >30 minutes    Signed:  Juliet Mclaughlin NP

## 2020-12-02 NOTE — PROGRESS NOTES
RAS:    DME   2nd  Medicare Letter    UPDATE: 4:26PM    CM informed by pt's nurse that pt's son would like [address] arranged, to assist with pt's lab draw. CM will arranged for pt to have [address] with [facility]. CM completed the needs of the pt at this time. CM attempted to schedule appointment with PCP and phone would ring until it disconnect. MAIKEL completed the needs of the pt at this time    NAZANIN Reyes, [place] [address]        CM staffed case with NP and CM was informed that pt will be able to d/c on today and transition back home with family. CM contacted pt's son: Rafat Leonard, via telephone and informed him of pt's d/c on today and transport time being arranged at 4:30pm today. Rafat Leonard informed CM that wheelchair, provided by [employer] will be delivered between 1-4pm, to pt's home. CM informed Rafat Leonard of 2nd  Medicare Letter (verbal consent) placed in chart. CM completed the needs of the pt at this time.     NAZANIN Reyes, [address] [place] [address]

## 2020-12-02 NOTE — PROGRESS NOTES
Called patients son to review discharge instructions. He stated he had no way to get medications prior to patient getting home. Patient's son was on the phone for an hour speaking with nurse and  going back and forth about if he thought he could take care of patient but he didn't want her to go to a facility. He finally stated he wanted patient to come home but he needed time to get medicine and supplies. Ingrid Hammonds NP who cancelled discharge and stated we may leave IV out and telemetry off,prescriptions were faxed to pharmacy.  Called son  and told him that he needed to  prescriptions by 9 pm.Ambulance here and cancelled

## 2020-12-02 NOTE — PROGRESS NOTES
Hospitalist Progress Note    NAME: Lian Alaniz   :  1923   MRN:  [id number]     I reviewed pertinent labs/imaging and discussed plan of care with Dr. Greg Frost who is in agreement. Assessment / Plan:  Intractable seizure  Generalized weakness  Frequent falls    AMS  CT head 20: Moderate chronic small vessel ischemic disease. No acute intracranial abnormality. MRI brain 20:  1. Sequela of cerebral atrophy and chronic microvascular ischemic disease but no evidence of mass, edema, or hemorrhage. 2.  No acute infarct. - Neuro input appreciated. - Speech input appreciated. - According to son patient had seizure 10 years ago with similar symptoms for which she was prescribed medication but refused to take. - No further seizure activity. - Continue levetiracetam 500 mg po bid. Mild leucocytosis  - Patient afebrile with no obvious sign of infection.  - Follow up cbc in 1 week. HTN  Dyslipidemia   - Adequate BP control.    - Continue asa 81 mg po daily. - Continue amlodipine 5 mg po daily. - Continue lisinopril 10 mg po daily. - Continue atorvastatin 40 mg po daily. - Continue hydralazine prn. Mild hyperglycemia, resolved   - No tx indicated. - Monitor with a.m. labs. Protein calorie malnutrition  - Continue nutritional supplements. Hypokalemia, resolved   - Monitor. Misc  - Patient discharged today however patient's son decided at last minute that he was unable to receive patient. D/C will be held until tomorrow. less than 18.5 Underweight / Body mass index is 17.33 kg/m². Code status: Full  Prophylaxis: Hep SQ  Recommended Disposition: Home w/Family     Subjective:     Chief Complaint / Reason for Physician Visit  Patient answers questions and when told that she was going to go home responded, \"that's very good. \"  Plan of care and pertinent events reviewed with bedside nurse.      Review of Systems:  Symptom Y/N Comments Symptom Y/N Comments   Fever/Chills    Chest Pain     Poor Appetite    Edema     Cough    Abdominal Pain     Sputum    Joint Pain     SOB/VAUGHAN    Pruritis/Rash     Nausea/vomit    Tolerating PT/OT     Diarrhea    Tolerating Diet     Constipation    Other       Could NOT obtain due to:      Objective:     VITALS:   Last 24hrs VS reviewed since prior progress note. Most recent are:  Patient Vitals for the past 24 hrs:   Temp Pulse Resp BP SpO2   12/02/20 1537 98.1 °F (36.7 °C) 79  133/60 94 %   12/02/20 1129 98.1 °F (36.7 °C) 72  (!) 135/51 94 %   12/02/20 1052  82  (!) 154/69    12/02/20 0746 98.1 °F (36.7 °C) 92 16 (!) 141/66 94 %   12/02/20 0403 98.9 °F (37.2 °C) 95 16 (!) 161/66 95 %   12/01/20 2345 98.3 °F (36.8 °C) (!) 102 16 (!) 151/57 93 %   12/01/20 1957 98.3 °F (36.8 °C) 99 16 (!) 145/57 95 %     No intake or output data in the 24 hours ending 12/02/20 1728     I had a face to face encounter and independently examined this patient on 12/2/2020, as outlined below:  PHYSICAL EXAM:  General:  A/A. Much more interactive today than yesterday. NAD.     HEENT:  Normocephalic. Sclera anicteric. Mucous membranes moist.    Chest:  Resps even/unlabored with symmetrical CWE. Air entry diminished at bases. Lungs CTA. No use of accessory muscles. CV:  RRR. Normal S1/S2. No M/C/R appreciated. No JVD. No peripheral edema. GI:  Abdomen soft/NT/ND. No organomegaly. No hernia. ABT X 4.    :  Voiding. Neurologic:  Generally weak and deconditioned. Face symmetrical.  Speech normal.     Psych:  Cooperative. No anxiety or agitation. No seizure activity noted. Skin:  No rashes or jaundice.       Reviewed most current lab test results and cultures  YES  Reviewed most current radiology test results   YES  Review and summation of old records today    NO  Reviewed patient's current orders and MAR    YES  PMH/SH reviewed - no change compared to H&P  ________________________________________________________________________  Care Plan discussed with:    Comments   Patient Y    Family      RN Y    Care Manager     Consultant                        Multidiciplinary team rounds were held today with , nursing, pharmacist and clinical coordinator. Patient's plan of care was discussed; medications were reviewed and discharge planning was addressed. ________________________________________________________________________  Alber White NP     Procedures: see electronic medical records for all procedures/Xrays and details which were not copied into this note but were reviewed prior to creation of Plan. LABS:  I reviewed today's most current labs and imaging studies.   Pertinent labs include:  Recent Labs     12/02/20  0515 12/01/20  0208 11/30/20  0024   WBC 11.2* 10.3 7.4   HGB 11.5 12.1 11.3*   HCT 34.7* 36.4 34.6*    179 164     Recent Labs     12/02/20  0515 12/01/20  0208 11/30/20  0024    137 143   K 3.6 3.9 3.7    108 112*   CO2 25 25 25   GLU 86 117* 110*   BUN 16 16 10   CREA 0.59 0.71 0.54*   CA 9.2 9.0 9.1   MG 1.8  --  1.9   PHOS 3.6  --   --    ALB  --  2.9* 2.9*   TBILI  --  0.4 0.4   ALT  --  20 19       Signed: Alber White NP

## 2020-12-03 ENCOUNTER — HOSPITAL ENCOUNTER (EMERGENCY)
Age: 85
Discharge: NURSING, REHAB OR LONG-TERM CARE | End: 2020-12-04
Attending: EMERGENCY MEDICINE | Admitting: EMERGENCY MEDICINE
Payer: MEDICARE

## 2020-12-03 VITALS
WEIGHT: 82.89 LBS | BODY MASS INDEX: 17.4 KG/M2 | DIASTOLIC BLOOD PRESSURE: 60 MMHG | SYSTOLIC BLOOD PRESSURE: 124 MMHG | OXYGEN SATURATION: 92 % | HEIGHT: 58 IN | TEMPERATURE: 98 F | HEART RATE: 92 BPM | RESPIRATION RATE: 16 BRPM

## 2020-12-03 DIAGNOSIS — R53.81 PHYSICAL DECONDITIONING: ICD-10-CM

## 2020-12-03 DIAGNOSIS — R53.83 LETHARGY: Primary | ICD-10-CM

## 2020-12-03 PROCEDURE — 99285 EMERGENCY DEPT VISIT HI MDM: CPT

## 2020-12-03 PROCEDURE — 74011250637 HC RX REV CODE- 250/637: Performed by: NURSE PRACTITIONER

## 2020-12-03 PROCEDURE — 97535 SELF CARE MNGMENT TRAINING: CPT

## 2020-12-03 PROCEDURE — 74011250636 HC RX REV CODE- 250/636: Performed by: INTERNAL MEDICINE

## 2020-12-03 PROCEDURE — 97530 THERAPEUTIC ACTIVITIES: CPT

## 2020-12-03 PROCEDURE — 96374 THER/PROPH/DIAG INJ IV PUSH: CPT

## 2020-12-03 PROCEDURE — 74011250637 HC RX REV CODE- 250/637: Performed by: INTERNAL MEDICINE

## 2020-12-03 PROCEDURE — 99232 SBSQ HOSP IP/OBS MODERATE 35: CPT | Performed by: PSYCHIATRY & NEUROLOGY

## 2020-12-03 RX ADMIN — ASPIRIN 81 MG CHEWABLE TABLET 81 MG: 81 TABLET CHEWABLE at 11:07

## 2020-12-03 RX ADMIN — HEPARIN SODIUM 5000 UNITS: 5000 INJECTION INTRAVENOUS; SUBCUTANEOUS at 11:07

## 2020-12-03 RX ADMIN — LEVETIRACETAM 500 MG: 500 TABLET ORAL at 11:07

## 2020-12-03 RX ADMIN — LISINOPRIL 10 MG: 5 TABLET ORAL at 11:07

## 2020-12-03 RX ADMIN — AMLODIPINE BESYLATE 5 MG: 5 TABLET ORAL at 11:07

## 2020-12-03 NOTE — PROGRESS NOTES
Problem: Mobility Impaired (Adult and Pediatric)  Goal: *Acute Goals and Plan of Care (Insert Text)  Description:   FUNCTIONAL STATUS PRIOR TO ADMISSION: Patient was sba using a SB quad cane for functional mobility. Recent falls in to last 2 weeks. HOME SUPPORT PRIOR TO ADMISSION: The patient lived alone with son to provide assistance. Physical Therapy Goals  Initiated 11/30/2020  1. Patient will move from supine to sit and sit to supine  and roll side to side in bed with minimal assistance/contact guard assist within 7 day(s). 2.  Patient will transfer from bed to chair and chair to bed with moderate assistance  using the least restrictive device within 7 day(s). 3.  Patient will perform sit to stand with moderate assistance  within 7 day(s). 4.  Patient will ambulate with moderate assistance  for 15 feet with the least restrictive device within 7 day(s). 5.  Patient will improve Rosa Balance score by 7 points within 7 days. Outcome: Not Progressing Towards Goal    PHYSICAL THERAPY TREATMENT  Patient: Oralia Canada (22 y.o. female)  Date: 12/3/2020  Diagnosis: Intractable seizures ([facility] [address] [G40.919]   Intractable seizures ([address]       Precautions: Fall, Bed Alarm, Seizure, Skin  Chart, physical therapy assessment, plan of care and goals were reviewed. ASSESSMENT  Patient continues with skilled PT services and is not progressing towards goals. Pt was received in supine and cleared by nursing to mobilize. Pt very fatigued and cannot tolerate much. She required total A for repositioning in the bed and pillows placed to maintain upright position for OT to to work with feeding. Pt would benefit from LTC. Current Level of Function Impacting Discharge (mobility/balance): total    Other factors to consider for discharge:          PLAN :  Patient continues to benefit from skilled intervention to address the above impairments. Continue treatment per established plan of care.   to address goals. Recommendation for discharge: (in order for the patient to meet his/her long term goals)  Therapy up to 5 days/week in SNF setting to LTC    This discharge recommendation:  Has not yet been discussed the attending provider and/or case management    IF patient discharges home will need the following DME: to be determined (TBD)       SUBJECTIVE:   Patient stated Barb Duff don't feel good.     OBJECTIVE DATA SUMMARY:   Critical Behavior:  Neurologic State: Alert, Drowsy  Orientation Level: Oriented to person, Disoriented to time, Disoriented to situation, Disoriented to place  Cognition: Follows commands, Impaired decision making, Memory loss, Decreased command following, Decreased attention/concentration(takes increased time)  Safety/Judgement: Awareness of environment, Fall prevention  Functional Mobility Training:  Bed Mobility:     Supine to Sit: Total assistance  Sit to Supine: Total assistance                 Balance:  Sitting: Impaired; With support  Sitting - Static: Fair (occasional)  Sitting - Dynamic: Poor (constant support)    Activity Tolerance:   Poor    After treatment patient left in no apparent distress:   Supine in bed and Call bell within reach    COMMUNICATION/COLLABORATION:   The patients plan of care was discussed with: Occupational therapist and Registered nurse.      Shant Peralta, PT, DPT   Time Calculation: 12 mins

## 2020-12-03 NOTE — PROGRESS NOTES
Pt son called unit in ref to D/C info    Son is reconsidering D/C placement.   Preferring SNF over [facility]

## 2020-12-03 NOTE — PROGRESS NOTES
Chief Complaint: Seizures    Remains confused. Seems to be at premorbid baseline. No seizures. Assesment and Plan  1. Seizures ([facility] [address]  None since admission  Continue keppra  MRI shows cerebral atrophy    2. Altered mental status, unspecified altered mental status type  hypertensive vs delirium    3.  Hypertension  continue lisinopril           Allergies  Penicillins     Medications  Current Facility-Administered Medications   Medication Dose Route Frequency    aspirin chewable tablet 81 mg  81 mg Oral DAILY    levETIRAcetam (KEPPRA) tablet 500 mg  500 mg Oral BID    influenza vaccine 2020-21 (6 mos+)(PF) (FLUARIX/FLULAVAL/FLUZONE QUAD) injection 0.5 mL  0.5 mL IntraMUSCular PRIOR TO DISCHARGE    amLODIPine (NORVASC) tablet 5 mg  5 mg Oral DAILY    lisinopriL (PRINIVIL, ZESTRIL) tablet 10 mg  10 mg Oral DAILY    haloperidol lactate (HALDOL) injection 1 mg  1 mg IntraMUSCular Q8H PRN    melatonin tablet 3 mg  3 mg Oral QHS    acetaminophen (TYLENOL) tablet 650 mg  650 mg Oral Q4H PRN    Or    acetaminophen (TYLENOL) solution 650 mg  650 mg Per NG tube Q4H PRN    Or    acetaminophen (TYLENOL) suppository 650 mg  650 mg Rectal Q4H PRN    atorvastatin (LIPITOR) tablet 40 mg  40 mg Oral QHS    hydrALAZINE (APRESOLINE) 20 mg/mL injection 5 mg  5 mg IntraVENous Q6H PRN    LORazepam (ATIVAN) injection 0.5 mg  0.5 mg IntraVENous Q2H PRN    heparin (porcine) injection 5,000 Units  5,000 Units SubCUTAneous Q12H        Medical History  Past Medical History:   Diagnosis Date    Hypertension     Seizure ([facility] [address]     Seizures ([facility])      Review of Systems   Unable to perform ROS: Mental acuity       Exam:    Visit Vitals  BP (!) 148/64   Pulse 86   Temp 99 °F (37.2 °C)   Resp 16   Ht 4' 9.99\" (1.473 m)   Wt 82 lb 14.3 oz (37.6 kg)   SpO2 95%   BMI 17.33 kg/m²      General:  Laying in bed in the disheveled state   Head: Normocephalic, atraumatic, anicteric sclera   Neck Normal ROM, No thyromegally   Cardiac: Regular rate and rhythm   Ext: No pedal edema   Skin: Supple no rash     NeurologicExam:  Mental Status:  Awake oriented to self   Speech:  Did not speak to me   Cranial Nerves:  II - XII Intact   Motor:  Full and symmetric strength of upper and lower ext. Normal bulk and tone. Sensory:   Symmetric and intact    Gait:   Deferred   Tremor:   No tremor noted. Cerebellar:  Coordination intact.          Lab Review  Lab Results   Component Value Date/Time    WBC 11.2 (H) 12/02/2020 05:15 AM    HCT 34.7 (L) 12/02/2020 05:15 AM    HGB 11.5 12/02/2020 05:15 AM    PLATELET 464 48/30/9696 05:15 AM       Lab Results   Component Value Date/Time    Sodium 139 12/02/2020 05:15 AM    Potassium 3.6 12/02/2020 05:15 AM    Chloride 106 12/02/2020 05:15 AM    CO2 25 12/02/2020 05:15 AM    Glucose 86 12/02/2020 05:15 AM    BUN 16 12/02/2020 05:15 AM    Creatinine 0.59 12/02/2020 05:15 AM    Calcium 9.2 12/02/2020 05:15 AM       Lab Results   Component Value Date/Time    Hemoglobin A1c 5.1 11/28/2020 02:25 AM        Lab Results   Component Value Date/Time    Cholesterol, total 124 11/28/2020 02:25 AM    HDL Cholesterol 52 11/28/2020 02:25 AM    LDL, calculated 59.8 11/28/2020 02:25 AM    VLDL, calculated 12.2 11/28/2020 02:25 AM    Triglyceride 61 11/28/2020 02:25 AM    CHOL/HDL Ratio 2.4 11/28/2020 02:25 AM

## 2020-12-03 NOTE — PROGRESS NOTES
Patient is being discharged home and will be transported via [facility] at 2pm. All discharge paperwork has been completed and taught via teach back method. Patients son verbally stated understanding of all information provided.      Transport has been pushed back to 4:30pm

## 2020-12-03 NOTE — PROGRESS NOTES
Problem: Self Care Deficits Care Plan (Adult)  Goal: *Acute Goals and Plan of Care (Insert Text)  Description:   FUNCTIONAL STATUS PRIOR TO ADMISSION: 81 yo lives with son; he reports he assisted with self care tasks; patient reports she walks with quad cane and son stands by for all ADL and mobility tasks; son has requested w/c from  as he plans on taking her home rather than SNF, due to covid. She will need w/c in the home. She reports use of TTB and HHS PTA as well as grab bars by the commode which she was using with SBA per patient. HOME SUPPORT: lives with retired son who will need to clarify her previous level of care to maximize safe discharge planning    Occupational Therapy Goals  Initiated 11/30/2020  1. Patient will perform self-feeding with modified independence within 7 day(s). 2.  Patient will perform grooming with supervision/set-up within 7 day(s). 3.  Patient will perform upper body dressing and lower body dressing with minimal assistance/contact guard assist within 7 day(s). 4.  Patient will perform toilet transfers with moderate assistance  within 7 day(s). 5.  Patient will perform all aspects of toileting with moderate assistance  within 7 day(s). 6.  Patient will participate in upper extremity therapeutic exercise/activities with minimal assistance/contact guard assist for 5 minutes within 7 day(s). 7.  Patient will utilize energy conservation, pain management, fall prevention techniques during functional activities with verbal, visual, and tactile cues within 7 day(s). Outcome: Progressing Towards Goal  OCCUPATIONAL THERAPY TREATMENT  Patient: Chu Sky (17 y.o. female)  Date: 12/3/2020  Diagnosis: Intractable seizures ([facility] [address] [G40.919]   Intractable seizures ([address]       Precautions: Fall, Bed Alarm, Seizure, Skin  Chart, occupational therapy assessment, plan of care, and goals were reviewed.     ASSESSMENT  Patient continues with skilled OT services and is progressing towards goals. D functional mobility with increased time needed for all movements as patient is \"stiff. \"  Patient willing to work with therapy despite \"I feel bad all over. \"  Patient unable to open containers or set up tray but able to perform hand to mouth with S for 50% of meal; Min A needed to manage cup to mouth. Current Level of Function Impacting Discharge (ADLs): set up to min A feeding with max increased time, max A D grooming, D self care and functional mobility otherwise    Other factors to consider for discharge: son had planned to take home until last night, at time of scheduled discharge, then decided to opt for discharge to SNF level of care. PLAN :  Patient continues to benefit from skilled intervention to address the above impairments. Continue treatment per established plan of care. to address goals. Recommend with staff: 1:1 feeding set up with close S to bed position so food is in reach, all containers opened etc.    Recommend next OT session: EDGE of bed sitting/transfers  With A x 2 persons; grooming    Recommendation for discharge: (in order for the patient to meet his/her long term goals)  Therapy up to 5 days/week in SNF setting or an intensive home health therapy program    This discharge recommendation:  Has been made in collaboration with the attending provider and/or case management    IF patient discharges home will need the following DME: TBA       SUBJECTIVE:   Patient stated I like coffee.     OBJECTIVE DATA SUMMARY:   Cognitive/Behavioral Status:  Neurologic State: Alert;Drowsy  Orientation Level: Oriented to person;Disoriented to time;Disoriented to situation;Disoriented to place  Cognition: Follows commands; Impaired decision making;Memory loss;Decreased command following;Decreased attention/concentration(takes increased time)  Perception: (able to calibrate UE use w/eating meal, holding/placing cup)  Perseveration: No perseveration noted  Safety/Judgement: Awareness of environment; Fall prevention    Functional Mobility and Transfers for ADLs:  Bed Mobility:  Supine to Sit: Total assistance  Sit to Supine: Total assistance    Transfers:     Functional Transfers  Toilet Transfer : Total assistance       Balance:  Sitting: Impaired; With support  Sitting - Static: Fair (occasional)  Sitting - Dynamic: Poor (constant support)    ADL Intervention:  Feeding  Feeding Assistance: Moderate assistance;Maximum assistance  Container Management: Total assistance (dependent)  Cutting Food: Total assistance (dependent)  Utensil Management: Minimum assistance;Supervision  Food to Mouth: Supervision;Minimum assistance  Drink to Mouth: Minimum assistance;Supervision  Cues: Verbal cues provided; Tactile cues provided;Visual cues provided    Grooming  Grooming Assistance: Minimum assistance; Moderate assistance                        Toileting  Toileting Assistance: Total assistance(dependent)    Cognitive Retraining  Attention to Task: Single task  Maintains Attention For (Time): Greater than 10 minutes  Following Commands: Follows one step commands/directions  Safety/Judgement: Awareness of environment; Fall prevention      Activity Tolerance:   Poor and requires frequent rest breaks    After treatment patient left in no apparent distress:   Call bell within reach, Side rails x 3 and head of bed up 80 degrees for eating; Nsg notified that alarm not on    COMMUNICATION/COLLABORATION:   The patients plan of care was discussed with: Physical therapist, Registered nurse, Case management and Certified nursing assistant/patient care technician.      Kiley Tran OTR/L  Time Calculation: 39 mins

## 2020-12-03 NOTE — PROGRESS NOTES
End of Shift Note    Bedside shift change report given to Yumiko Zamarripa (oncoming nurse) by Shamar Crouch RN (offgoing nurse). Report included the following information SBAR, MAR and Recent Results    Shift worked:  [id number]   Shift summary and any significant changes:    Refused dinner. Discharge cancelled until tomorrow so son could obtain meds and supplies ( See note)       Concerns for physician to address:  see note   Zone phone for oncoming shift:   [phone]     Patient Information  Юлия Jansen  80 y.o.  11/27/2020  1:30 AM by Ebony Minaya MD. Юлия Jansen was admitted from Home    Problem List  Patient Active Problem List    Diagnosis Date Noted    Intractable seizures ([facility] [address] 11/27/2020    Hypokalemia 11/27/2020    HTN (hypertension) 11/27/2020    Acute encephalopathy 11/27/2020     Past Medical History:   Diagnosis Date    Hypertension     Seizure ([facility] [address]     Seizures ([facility] [address]        Core Measures:  CVA: No No  CHF:No No  PNA:No No    Activity:  Activity Level: Bed Rest  Number times ambulated in hallways past shift: 0  Number of times OOB to chair past shift: 1    Cardiac:   Cardiac Monitoring: Yes      Cardiac Rhythm: Sinus tachycardia    Access:   Current line(s): PIV       Genitourinary:   Urinary status: incontinent       Respiratory:   O2 Device: Room air  Chronic home O2 use?: NO  Incentive spirometer at bedside: NO       GI:  Last Bowel Movement Date: 11/29/20  Current diet:  DIET DYSPHAGIA ADVANCED SOFT (NDD3)  DIET NUTRITIONAL SUPPLEMENTS Breakfast, Dinner, Lunch; Ensure Enlive  Passing flatus: YES  Tolerating current diet: NPO  % Diet Eaten: 0 %    Pain Management:   Patient states pain is manageable on current regimen: N/A    Skin:  Danilo Score: 12  Interventions: limit briefs and internal/external urinary devices Dual skin assessment with ANTHONY    Patient Safety:  Fall Score:  Total Score: 4  Interventions: bed/chair alarm, gripper socks, pt to call before getting OOB, stay with me (per policy) and gait belt  High Fall Risk: Yes    DVT prophylaxis:  DVT prophylaxis Med- Yes  DVT prophylaxis SCD or TERRY- No     Wounds: (If Applicable)  Wounds- No  Location     Active Consults:  IP CONSULT TO NEUROLOGY  IP CONSULT TO PALLIATIVE CARE - PROVIDER    Length of Stay:  Expected LOS: 2d 16h  Actual LOS: 5  Discharge Plan: Yes, home with home health       [facility] e[facility] RN

## 2020-12-03 NOTE — PROGRESS NOTES
RAS:    C  ACP  2nd  Medicare Letter  Medical Transport      UPDATE: 2:59PM    CM contact [facility], via telephone to confirm ETA of transport (scheduled at 2:30PM). CM informed that transport has been pushed back to 4:30PM.  CM will inform pt's nurse of the following. NAZANIN Deleon,         CM made aware that pt will be d/c on today and will transition home with pt's son: Alber Bro. CM contact Alber Bro, via telephone regarding pt's d/c.  CM provided Alber Bro with meals on wheels info: [phone] to provide food for he and pt. Alber Bro informed CM that he received wheelchair. CM informed Alber Bro that [facility] has been arranged, provided by Jacinda Gracia. CM informed Alber Alfaroamira to contact 911 in case of emergency. CM provided transport packet and time to pt's nurse: 2:30pm [facility]. CM contacted APS and reported case with Shilpa Denis ([phone]). APS will review provided information and complete wellness visit, if deemed necessary. CM completed the needs of the pt at this time.     NAZANIN Deleon, [address] [place] [address]

## 2020-12-03 NOTE — PROGRESS NOTES
Bedside and Verbal shift change report given to Edward Rivera (oncoming nurse) by Pipe Rascon (offgoing nurse). Report included the following information SBAR, Kardex, MAR and Recent Results.

## 2020-12-04 ENCOUNTER — APPOINTMENT (OUTPATIENT)
Dept: GENERAL RADIOLOGY | Age: 85
End: 2020-12-04
Attending: EMERGENCY MEDICINE
Payer: MEDICARE

## 2020-12-04 VITALS
DIASTOLIC BLOOD PRESSURE: 63 MMHG | HEIGHT: 57 IN | OXYGEN SATURATION: 95 % | WEIGHT: 54.89 LBS | BODY MASS INDEX: 11.84 KG/M2 | RESPIRATION RATE: 23 BRPM | HEART RATE: 79 BPM | SYSTOLIC BLOOD PRESSURE: 140 MMHG | TEMPERATURE: 98.4 F

## 2020-12-04 LAB
ALBUMIN SERPL-MCNC: 2.5 G/DL (ref 3.5–5)
ALBUMIN/GLOB SERPL: 0.6 {RATIO} (ref 1.1–2.2)
ALP SERPL-CCNC: 62 U/L (ref 45–117)
ALT SERPL-CCNC: 21 U/L (ref 12–78)
ANION GAP SERPL CALC-SCNC: 6 MMOL/L (ref 5–15)
APPEARANCE UR: CLEAR
AST SERPL-CCNC: 29 U/L (ref 15–37)
ATRIAL RATE: 83 BPM
BACTERIA URNS QL MICRO: NEGATIVE /HPF
BASOPHILS # BLD: 0 K/UL (ref 0–0.1)
BASOPHILS NFR BLD: 0 % (ref 0–1)
BILIRUB SERPL-MCNC: 0.4 MG/DL (ref 0.2–1)
BILIRUB UR QL: NEGATIVE
BUN SERPL-MCNC: 34 MG/DL (ref 6–20)
BUN/CREAT SERPL: 52 (ref 12–20)
CALCIUM SERPL-MCNC: 9.5 MG/DL (ref 8.5–10.1)
CALCULATED P AXIS, ECG09: 74 DEGREES
CALCULATED R AXIS, ECG10: -38 DEGREES
CALCULATED T AXIS, ECG11: 57 DEGREES
CHLORIDE SERPL-SCNC: 108 MMOL/L (ref 97–108)
CO2 SERPL-SCNC: 27 MMOL/L (ref 21–32)
COLOR UR: ABNORMAL
COVID-19 RAPID TEST, COVR: NOT DETECTED
CREAT SERPL-MCNC: 0.66 MG/DL (ref 0.55–1.02)
DIAGNOSIS, 93000: NORMAL
DIFFERENTIAL METHOD BLD: ABNORMAL
EOSINOPHIL # BLD: 0 K/UL (ref 0–0.4)
EOSINOPHIL NFR BLD: 0 % (ref 0–7)
EPITH CASTS URNS QL MICRO: ABNORMAL /LPF
ERYTHROCYTE [DISTWIDTH] IN BLOOD BY AUTOMATED COUNT: 12.2 % (ref 11.5–14.5)
GLOBULIN SER CALC-MCNC: 4.2 G/DL (ref 2–4)
GLUCOSE SERPL-MCNC: 104 MG/DL (ref 65–100)
GLUCOSE UR STRIP.AUTO-MCNC: NEGATIVE MG/DL
HCT VFR BLD AUTO: 34.7 % (ref 35–47)
HEALTH STATUS, XMCV2T: NORMAL
HGB BLD-MCNC: 11.5 G/DL (ref 11.5–16)
HGB UR QL STRIP: ABNORMAL
HYALINE CASTS URNS QL MICRO: ABNORMAL /LPF (ref 0–5)
IMM GRANULOCYTES # BLD AUTO: 0 K/UL (ref 0–0.04)
IMM GRANULOCYTES NFR BLD AUTO: 0 % (ref 0–0.5)
KETONES UR QL STRIP.AUTO: NEGATIVE MG/DL
LACTATE BLD-SCNC: 0.71 MMOL/L (ref 0.4–2)
LEUKOCYTE ESTERASE UR QL STRIP.AUTO: NEGATIVE
LYMPHOCYTES # BLD: 1 K/UL (ref 0.8–3.5)
LYMPHOCYTES NFR BLD: 11 % (ref 12–49)
MCH RBC QN AUTO: 32.2 PG (ref 26–34)
MCHC RBC AUTO-ENTMCNC: 33.1 G/DL (ref 30–36.5)
MCV RBC AUTO: 97.2 FL (ref 80–99)
MONOCYTES # BLD: 0.8 K/UL (ref 0–1)
MONOCYTES NFR BLD: 8 % (ref 5–13)
NEUTS SEG # BLD: 7.1 K/UL (ref 1.8–8)
NEUTS SEG NFR BLD: 81 % (ref 32–75)
NITRITE UR QL STRIP.AUTO: NEGATIVE
NRBC # BLD: 0 K/UL (ref 0–0.01)
NRBC BLD-RTO: 0 PER 100 WBC
P-R INTERVAL, ECG05: 176 MS
PH UR STRIP: 5.5 [PH] (ref 5–8)
PLATELET # BLD AUTO: 174 K/UL (ref 150–400)
PMV BLD AUTO: 11.1 FL (ref 8.9–12.9)
POTASSIUM SERPL-SCNC: 3.4 MMOL/L (ref 3.5–5.1)
PROT SERPL-MCNC: 6.7 G/DL (ref 6.4–8.2)
PROT UR STRIP-MCNC: 30 MG/DL
Q-T INTERVAL, ECG07: 366 MS
QRS DURATION, ECG06: 86 MS
QTC CALCULATION (BEZET), ECG08: 430 MS
RBC # BLD AUTO: 3.57 M/UL (ref 3.8–5.2)
RBC #/AREA URNS HPF: ABNORMAL /HPF (ref 0–5)
SODIUM SERPL-SCNC: 141 MMOL/L (ref 136–145)
SOURCE, COVRS: NORMAL
SP GR UR REFRACTOMETRY: 1.02 (ref 1–1.03)
SPECIMEN SOURCE, FCOV2M: NORMAL
SPECIMEN TYPE, XMCV1T: NORMAL
UA: UC IF INDICATED,UAUC: ABNORMAL
UROBILINOGEN UR QL STRIP.AUTO: 0.2 EU/DL (ref 0.2–1)
VENTRICULAR RATE, ECG03: 83 BPM
WBC # BLD AUTO: 8.9 K/UL (ref 3.6–11)
WBC URNS QL MICRO: ABNORMAL /HPF (ref 0–4)

## 2020-12-04 PROCEDURE — 85025 COMPLETE CBC W/AUTO DIFF WBC: CPT

## 2020-12-04 PROCEDURE — 74011250636 HC RX REV CODE- 250/636: Performed by: EMERGENCY MEDICINE

## 2020-12-04 PROCEDURE — 77030019905 HC CATH URETH INTMIT MDII -A

## 2020-12-04 PROCEDURE — 36415 COLL VENOUS BLD VENIPUNCTURE: CPT

## 2020-12-04 PROCEDURE — 77030040392 HC DRSG OPTIFOAM MDII -A

## 2020-12-04 PROCEDURE — 93005 ELECTROCARDIOGRAM TRACING: CPT

## 2020-12-04 PROCEDURE — 96374 THER/PROPH/DIAG INJ IV PUSH: CPT

## 2020-12-04 PROCEDURE — 83605 ASSAY OF LACTIC ACID: CPT

## 2020-12-04 PROCEDURE — 87040 BLOOD CULTURE FOR BACTERIA: CPT

## 2020-12-04 PROCEDURE — 80053 COMPREHEN METABOLIC PANEL: CPT

## 2020-12-04 PROCEDURE — 71045 X-RAY EXAM CHEST 1 VIEW: CPT

## 2020-12-04 PROCEDURE — 87635 SARS-COV-2 COVID-19 AMP PRB: CPT

## 2020-12-04 PROCEDURE — 81001 URINALYSIS AUTO W/SCOPE: CPT

## 2020-12-04 RX ORDER — LEVETIRACETAM 5 MG/ML
500 INJECTION INTRAVASCULAR
Status: COMPLETED | OUTPATIENT
Start: 2020-12-04 | End: 2020-12-04

## 2020-12-04 RX ADMIN — LEVETIRACETAM 500 MG: 5 INJECTION INTRAVENOUS at 03:38

## 2020-12-04 NOTE — ED NOTES
Bedside and Verbal shift change report given to Manuel Roman (oncoming nurse) by Satya Vivar RN (offgoing nurse). Report included the following information SBAR, Kardex, ED Summary, Intake/Output, MAR and Recent Results.

## 2020-12-04 NOTE — ED NOTES
Assumed care of patient from [facility]. Per report, pt is awaiting Case Management for SNF placement.

## 2020-12-04 NOTE — ED NOTES
Pt has been discharged to [address] [place] [address]. Verbal and bedside report given to transport team. Pt stable at discharge.

## 2020-12-04 NOTE — ED PROVIDER NOTES
EMERGENCY DEPARTMENT HISTORY AND PHYSICAL EXAM     ----------------------------------------------------------------------------  Please note that this dictation was completed with HandMinder, the computer voice recognition software. Quite often unanticipated grammatical, syntax, homophones, and other interpretive errors are inadvertently transcribed by the computer software. Please disregard these errors. Please excuse any errors that have escaped final proofreading  ----------------------------------------------------------------------------      Date: 12/3/2020  Patient Name: Jett Eric    History of Presenting Illness     Chief Complaint   Patient presents with    Lethargy     arrived with EMS from home after family stating patient more lethargic today; discharged from [facility] earlier today for treatment of intractable seizures; VSS; patient responsive to pain only       History Provided By:  Patient    HPI: Jett Eric is a 80 y.o. female, with significant pmhx of hyperetention, seizures, who presents via EMS to the ED with report of increased lethargy. Patient was just discharged from our hospital earlier today and returned home under the care of her son. Patient with \"increased sleeping\" today per EMS and was sent back to the emergency department for further evaluation. Patient wakes to tactile stimulus, opens her eyes. When asked if she is in any pain she states she has generalized \"pain all over\" and then promptly goes back to sleep.   Appears in no respiratory distress and otherwise appears comfortable    No further HPI patient can be obtained at this time    PCP: Lyn Oro MD    Allergies   Allergen Reactions    Penicillins Anaphylaxis       Current Outpatient Medications   Medication Sig Dispense Refill    acetaminophen (TYLENOL) 325 mg tablet Take 2 Tabs by mouth every four (4) hours as needed for Fever (For temp greater than or equal to 38.5 C or 101.3 F (Unless hepatic failure or contrindicated). Give first line for fever. ). 50 Tab 0    amLODIPine (NORVASC) 5 mg tablet Take 1 Tab by mouth daily. 30 Tab 0    atorvastatin (LIPITOR) 40 mg tablet Take 1 Tab by mouth nightly. 30 Tab 0    levETIRAcetam (KEPPRA) 500 mg tablet Take 1 Tab by mouth two (2) times a day. 60 Tab 0    lisinopriL (PRINIVIL, ZESTRIL) 10 mg tablet Take 1 Tab by mouth daily. 30 Tab 0    Walker misc 1 Units by Does Not Apply route as needed (unsteady gait). 1 Each 1    omega 3-dha-epa-fish oil (FISH OIL) 100-160-1,000 mg cap Take 1 Cap by mouth daily.  calcium-cholecalciferol, D3, (CALTRATE 600+D) tablet Take 1 Tab by mouth daily.  vitamin V-U-E-lutein-minerals (OCUVITE) tablet Take 1 Tab by mouth daily.  multivitamin (ONE A DAY) tablet Take 1 Tab by mouth daily.  aspirin delayed-release 81 mg tablet Take 81 mg by mouth daily. Past History     Past Medical History:  Past Medical History:   Diagnosis Date    Hypertension     [facility]iz[facility] ([facility] [address]     Seizures ([facility] [address]        Past Surgical History:  Past Surgical History:   Procedure Laterality Date    HX ORTHOPAEDIC      hip, shoulder, elbow       Family History:  No family history on file. Social History:  Social History     Tobacco Use    Smoking status: Never Smoker    Smokeless tobacco: Never Used   Substance Use Topics    Alcohol use: Not Currently    Drug use: Never       Allergies: Allergies   Allergen Reactions    Penicillins Anaphylaxis         Review of Systems   Review of Systems   Unable to perform ROS: Other         Physical Exam   Physical Exam  Vitals signs and nursing note reviewed. Constitutional:       General: She is not in acute distress. Appearance: She is underweight. She is not ill-appearing, toxic-appearing or diaphoretic. HENT:      Head: Normocephalic and atraumatic. Nose: Nose normal.   Eyes:      General: No scleral icterus.      Conjunctiva/sclera: Conjunctivae normal.   Neck:      Musculoskeletal: Normal range of motion. Trachea: No tracheal deviation. Cardiovascular:      Rate and Rhythm: Normal rate and regular rhythm. Heart sounds: Normal heart sounds. No murmur. No friction rub. Pulmonary:      Effort: Pulmonary effort is normal. No respiratory distress. Breath sounds: Normal breath sounds. No stridor. No wheezing or rales. Abdominal:      General: Bowel sounds are normal. There is no distension. Palpations: Abdomen is soft. Tenderness: There is no abdominal tenderness. There is no rebound. Musculoskeletal: Normal range of motion. General: No tenderness. Skin:     General: Skin is warm and dry. Findings: No rash. Neurological:      Mental Status: She is oriented to person, place, and time. She is lethargic. Cranial Nerves: No cranial nerve deficit. Psychiatric:         Speech: Speech normal.         Behavior: Behavior normal.         Thought Content:  Thought content normal.         Judgment: Judgment normal.           Diagnostic Study Results     Labs -     Recent Results (from the past 12 hour(s))   EKG, 12 LEAD, INITIAL    Collection Time: 12/04/20 12:13 AM   Result Value Ref Range    Ventricular Rate 83 BPM    Atrial Rate 83 BPM    P-R Interval 176 ms    QRS Duration 86 ms    Q-T Interval 366 ms    QTC Calculation (Bezet) 430 ms    Calculated P Axis 74 degrees    Calculated R Axis -38 degrees    Calculated T Axis 57 degrees    Diagnosis       Sinus rhythm with premature supraventricular complexes  Left axis deviation  Confirmed by Aren Underwood M.D. ([id number]) on 12/4/2020 8:44:50 AM     CBC WITH AUTOMATED DIFF    Collection Time: 12/04/20 12:23 AM   Result Value Ref Range    WBC 8.9 3.6 - 11.0 K/uL    RBC 3.57 (L) 3.80 - 5.20 M/uL    HGB 11.5 11.5 - 16.0 g/dL    HCT 34.7 (L) 35.0 - 47.0 %    MCV 97.2 80.0 - 99.0 FL    MCH 32.2 26.0 - 34.0 PG    MCHC 33.1 30.0 - 36.5 g/dL    RDW 12.2 11.5 - 14.5 %    PLATELET 336 523 - 919 K/uL    MPV 11.1 8.9 - 12.9 FL    NRBC 0.0 0  WBC    ABSOLUTE NRBC 0.00 0.00 - 0.01 K/uL    NEUTROPHILS 81 (H) 32 - 75 %    LYMPHOCYTES 11 (L) 12 - 49 %    MONOCYTES 8 5 - 13 %    EOSINOPHILS 0 0 - 7 %    BASOPHILS 0 0 - 1 %    IMMATURE GRANULOCYTES 0 0.0 - 0.5 %    ABS. NEUTROPHILS 7.1 1.8 - 8.0 K/UL    ABS. LYMPHOCYTES 1.0 0.8 - 3.5 K/UL    ABS. MONOCYTES 0.8 0.0 - 1.0 K/UL    ABS. EOSINOPHILS 0.0 0.0 - 0.4 K/UL    ABS. BASOPHILS 0.0 0.0 - 0.1 K/UL    ABS. IMM. GRANS. 0.0 0.00 - 0.04 K/UL    DF AUTOMATED     METABOLIC PANEL, COMPREHENSIVE    Collection Time: 12/04/20 12:23 AM   Result Value Ref Range    Sodium 141 136 - 145 mmol/L    Potassium 3.4 (L) 3.5 - 5.1 mmol/L    Chloride 108 97 - 108 mmol/L    CO2 27 21 - 32 mmol/L    Anion gap 6 5 - 15 mmol/L    Glucose 104 (H) 65 - 100 mg/dL    BUN 34 (H) 6 - 20 MG/DL    Creatinine 0.66 0.55 - 1.02 MG/DL    BUN/Creatinine ratio 52 (H) 12 - 20      GFR est AA >60 >60 ml/min/1.73m2    GFR est non-AA >60 >60 ml/min/1.73m2    Calcium 9.5 8.5 - 10.1 MG/DL    Bilirubin, total 0.4 0.2 - 1.0 MG/DL    ALT (SGPT) 21 12 - 78 U/L    AST (SGOT) 29 15 - 37 U/L    Alk.  phosphatase 62 45 - 117 U/L    Protein, total 6.7 6.4 - 8.2 g/dL    Albumin 2.5 (L) 3.5 - 5.0 g/dL    Globulin 4.2 (H) 2.0 - 4.0 g/dL    A-G Ratio 0.6 (L) 1.1 - 2.2     CULTURE, BLOOD, PAIRED    Collection Time: 12/04/20 12:23 AM    Specimen: Blood   Result Value Ref Range    Special Requests: NO SPECIAL REQUESTS      Culture result: NO GROWTH AFTER 7 HOURS     POC LACTIC ACID    Collection Time: 12/04/20 12:25 AM   Result Value Ref Range    Lactic Acid (POC) 0.71 0.40 - 2.00 mmol/L   URINALYSIS W/ REFLEX CULTURE    Collection Time: 12/04/20  1:31 AM    Specimen: Urine   Result Value Ref Range    Color YELLOW/STRAW      Appearance CLEAR CLEAR      Specific gravity 1.020 1.003 - 1.030      pH (UA) 5.5 5.0 - 8.0      Protein 30 (A) NEG mg/dL    Glucose Negative NEG mg/dL    Ketone Negative NEG mg/dL    Bilirubin Negative NEG      Blood TRACE (A) NEG      Urobilinogen 0.2 0.2 - 1.0 EU/dL    Nitrites Negative NEG      Leukocyte Esterase Negative NEG      WBC 0-4 0 - 4 /hpf    RBC 10-20 0 - 5 /hpf    Epithelial cells FEW FEW /lpf    Bacteria Negative NEG /hpf    UA:UC IF INDICATED CULTURE NOT INDICATED BY UA RESULT CNI      Hyaline cast 2-5 0 - 5 /lpf       Radiologic Studies -   XR CHEST PORT   Final Result   IMPRESSION: No acute findings. CT Results  (Last 48 hours)    None        CXR Results  (Last 48 hours)               12/04/20 0117  XR CHEST PORT Final result    Impression:  IMPRESSION: No acute findings. Narrative:  EXAM: XR CHEST PORT       INDICATION: Lethargy       COMPARISON: November 27       FINDINGS: A portable AP radiograph of the chest was obtained at 0052 hours. There is atherosclerosis of the aorta. The patient is on a cardiac monitor. The   lungs are clear. The cardiac and mediastinal contours and pulmonary vascularity   are normal.  The patient is status post left shoulder replacement surgery. Medical Decision Making   I am the first provider for this patient. I reviewed the vital signs, available nursing notes, past medical history, past surgical history, family history and social history. Vital Signs-Reviewed the patient's vital signs.   Patient Vitals for the past 12 hrs:   Temp Pulse Resp BP SpO2   12/04/20 0800 98.9 °F (37.2 °C) 73 18 (!) 129/53 92 %   12/04/20 0635  78 16 (!) 143/54 93 %   12/04/20 0420  74 16 (!) 138/50 95 %   12/04/20 0300  76 22 (!) 141/62 94 %   12/04/20 0200  75 19 (!) 140/58 93 %   12/03/20 2354  84 25 139/63 95 %   12/03/20 2346 99.1 °F (37.3 °C) 83  (!) 144/70 95 %       Pulse Oximetry Analysis - 95% on RA, normal  Cardiac Monitor:   Rate: 83 bpm  Rhythm: nsr      Provider Notes (Medical Decision Making):     DDX:  UTI, electrolyte abnormality, pneumonia    Plan:  Labs, ua, cxr, ekg    Impression:  Lethargy, deconditioning from recent hospitalization    ED Course:   Initial assessment performed. The patients presenting problems have been discussed, and they are in agreement with the care plan formulated and outlined with them. I have encouraged them to ask questions as they arise throughout their visit. I reviewed the nursing notes and and vital signs from today's visit, as well as the electronic medical record system for any past medical records that were available that may contribute to the patients current condition, including discharge summary from today noting home health set up and at home wheelchair delivered to the house prior to pt's discharge    Nursing notes will be reviewed as they become available in realtime while the pt has been in the ED. Tonie Rosenbaum MD    I personally reviewed/interpreted pt's imaging. Agree with official read by radiology as noted above. Tonie Rosenbaum MD    PROGRESS NOTE:  3:07 AM  Patient's son contacted through nursing staff who reports he did not visit the patient while she was in the hospital and did not realize the extent of help/care she would require once returned home. Upon receiving her home to take care of her he realized he was ill-equipped to do so. Notes that he was \"told by case management that should he find he could not take care of his mother, he was to call 911 to bring her back. \"    Pt without significant findings on work up for uti, pna or severe electrolyte abnormality, continues to rest quietly throughout her ED visit. Will continue to monitor while awaiting further discussion between case management and pt's son. Have ordred Keppra IV to continue pt's previously prescribed antiepileptic and ensure no recurrence of previous seizure activity noted on last admission. Will place an order for casemanagement eval and placement in AM  Tonie Rosenbaum MD    0630 AM  Patient's presentation, labs/imaging and plan of care was reviewed with Dr. Mike Lopez as part of sign out.   They will f/u on case management recommendations as part of the plan discussed with the patient. Dr. Sharlene Shin assistance in completion of this plan is greatly appreciated but it should be noted that I will be the provider of record for this patient. Aubrey Shaw MD          ED Course as of Dec 09 0343   Fri Dec 04, 2020   1357 Patient signed out to me from Dr. Castro Masterson pending case management consultation. In short this is a 80-year-old female recently discharged from hospital yesterday after admission for seizures. Son is unable to take care of her and he brings her back to the emergency department a few hours after her discharge last night requesting placement. Patient has no other complaints tonight. Patient has been accepted at [address] [place] [address].    Harjit Thompson      ED Course User Index  [AK] Ann-Marie Acosta MD       Critical Care Time:     none      Diagnosis     Clinical Impression:   1. Lethargy    2.  Physical deconditioning        PLAN:  1. Dc to facility

## 2020-12-04 NOTE — ED NOTES
Pt is to be discharged to [address] [place] [address]. SBAR report was called to [place], RN. Opportunity to ask questions was provided. Per , pick-up ETA is [phone].

## 2020-12-04 NOTE — PROGRESS NOTES
3:42 PM  Pt will be going to room 45 A at [address] [place] [address]. Transportation arranged through [facility] with  time between 5 PM-5:30 PM.  notified Geoff Cervantes MFA RN Liaison, and pt's son, Navarro Cobos, of transport time for pt.  has provided bedside RN with number to call report: p) [phone]. No further concerns indicated at this time. AVS updated. PCS and supporting documentation completed and on chart. Pt is ready for discharge from a Care Management standpoint. RN informed. 2:16 PM  Received return call back from [employer]er at [facility], still awaiting insurance authorization. [employer] C[employer]er to call this writer back when [place] is obtained, this writer to arrange transport. 2:05 PM  Pt has been accepted by [facility]. Awaiting return call from [facility] regarding when I can arrange BLS transport. Also awaiting COVID rapid test results. CM updated pt's son. Original note:  CM acknowledged consult. CM reviewed chart and previous admission notes. CM talked with pt's son, Navarro Cobos, via phone and verified demographics. Pt discharged home on 12/3 and then returned to [address] [facility] ED due to pt's son being unable to manage her needs at home. Pt was followed by PT/OT on previous admission, who recommended SNF level of rehab stay for pt. Family declined SNF at this time and opted for pt to return home. Pt's son was unable to visit pt during her hospitalization, and states that he was unaware of her current needs upon her returning home. Previous CM ordered wheelchair for pt, pt's son verified that wheelchair was received at home. Pt also has a cane. Pt's son lives with pt, and states that he provides assistance to her in home. Pt's son does medication management for pt. He states that pt was skipping her blood pressure medicines due to running low and not having a prescription refill from PCP. Pt's son states that PCP is Charbel Steiner.  Pt has not seen PCP recently to get prescription refills due to concerns of COVID-19. CM discussed importance and need for PCP follow up for medication refills and continuity of care, and discussed virtual/telephonic options to obtain follow up. Pt's son states his mother was previously able to ambulate with cane, do her own sponge bathing and toileting. He assisted with other household chores for her. Pt's son states that pt's decline began around Thanksgiving when she had a seizure, and now she isn't able to do anything for herself. He states that upon her returning home, she was sliding down in her wheelchair. Pt's son states that he would like for CM to pursue SNF rehab placement for pt based on previous PT/OT evaluations and recommendations. Pt's long term goals are to regain strength and return home with support of son. CM reviewed and completed freedom of choice form, pt's son has provided [address] as top preference. CM has submitted referral to [facility] via [address] [place] [address], and faxed therapy records to Parth Laguerre in admissions at f) [phone]. Upon speaking with Parth Laguerre, he states that they have available beds and that he will review information. Pt will need BLS transport at d/c. CM will update pt's son with status of SNF referral. Pt will need rapid COVID test prior to d/c as required by SNF for placement.     Clara Jovel [facility] [address]

## 2020-12-04 NOTE — ED NOTES
Bedside shift change report given to BRISA RN (oncoming nurse) by Jorden Vazquez RN (offgoing nurse). Report included the following information SBAR, Kardex, ED Summary, Intake/Output, MAR and Recent Results.

## 2020-12-09 LAB
BACTERIA SPEC CULT: NORMAL
SERVICE CMNT-IMP: NORMAL

## 2021-01-06 ENCOUNTER — TRANSCRIBE ORDER (OUTPATIENT)
Dept: SCHEDULING | Age: 86
End: 2021-01-06

## 2021-01-06 DIAGNOSIS — S22.080A COMPRESSION FRACTURE OF T12 VERTEBRA (HCC): Primary | ICD-10-CM

## 2022-03-18 PROBLEM — E87.6 HYPOKALEMIA: Status: ACTIVE | Noted: 2020-11-27

## 2022-03-18 PROBLEM — G93.40 ACUTE ENCEPHALOPATHY: Status: ACTIVE | Noted: 2020-11-27

## 2022-03-18 PROBLEM — G40.919 INTRACTABLE SEIZURES (HCC): Status: ACTIVE | Noted: 2020-11-27

## 2022-03-20 PROBLEM — I10 HTN (HYPERTENSION): Status: ACTIVE | Noted: 2020-11-27

## 2023-05-11 RX ORDER — LISINOPRIL 10 MG/1
TABLET ORAL DAILY
COMMUNITY
Start: 2020-12-03

## 2023-05-11 RX ORDER — CALCIUM CARBONATE/VITAMIN D3 600 MG-10
1 TABLET ORAL DAILY
COMMUNITY

## 2023-05-11 RX ORDER — ATORVASTATIN CALCIUM 40 MG/1
TABLET, FILM COATED ORAL
COMMUNITY
Start: 2020-12-02

## 2023-05-11 RX ORDER — LEVETIRACETAM 500 MG/1
TABLET ORAL 2 TIMES DAILY
COMMUNITY
Start: 2020-12-02

## 2023-05-11 RX ORDER — AMLODIPINE BESYLATE 5 MG/1
TABLET ORAL DAILY
COMMUNITY
Start: 2020-12-03

## 2023-05-11 RX ORDER — ACETAMINOPHEN 325 MG/1
TABLET ORAL EVERY 4 HOURS PRN
COMMUNITY
Start: 2020-12-02

## 2023-05-11 RX ORDER — ASPIRIN 81 MG/1
81 TABLET ORAL DAILY
COMMUNITY

## 2024-08-07 NOTE — PROGRESS NOTES
Anesthesia Pre Eval Note    Anesthesia ROS/Med Hx          Pulmonary Review:    Positive for sleep apnea     Neuro/Psych Review:       Positive for psychiatric history    Cardiovascular Review:     Positive for hypertension  Positive for hyperlipidemia  Positive for DVT/PE    GI/HEPATIC/RENAL Review:     Positive for hepatitis Positive for liver disease   Positive for renal disease    End/Other Review:  Positive for diabetes  Positive for obesity class III - 40.00 - 49.99  Positive for hypothyroidism  Positive for chronic pain  Positive for cancer      Relevant Problems   Anesthesia Problems   (+) Mild obstructive sleep apnea       Physical Exam     Airway   Mallampati: II    Cardiovascular  Cardiovascular exam normal    Head Assessment  Head assessment: Normocephalic and Atraumatic    General Assessment  General Assessment: No acute distress and Alert and oriented    Dental Exam  Dental exam normal    Pulmonary Exam  Pulmonary exam normal      Anesthesia Plan:    ASA Status: 3  Anesthesia Type: General    Induction: Intravenous  Preferred Airway Type: ETT    Post-op Pain Management: Per Surgeon      Checklist  Reviewed: Lab Results, Past Med History, Anesthesia Record, NPO Status, Patient Summary, Allergies, Medications and Problem list  Consent/Risks Discussed Statement:  The proposed anesthetic plan, including its risks and benefits, have been discussed with the Patient along with the risks and benefits of alternatives. Questions were encouraged and answered and the patient and/or representative understands and agrees to proceed.        I discussed with the patient (and/or patient's legal representative) the risks and benefits of the proposed anesthesia plan, General, which may include services performed by other anesthesia providers.    Alternative anesthesia plans, if available, were reviewed with the patient (and/or patient's legal representative). Discussion has been held with the patient (and/or patient's  Attempted to contact patients daughter in order to receive information for patients database, but was unsuccessful.  Will try again later      Unsuccessful attempt x2 legal representative) regarding risks of anesthesia, which include allergic reaction, anxiety, nausea, vomiting and intra-operative awareness and emergent situations that may require change in anesthesia plan.    The patient (and/or patient's legal representative) has indicated understanding, his/her questions have been answered, and he/she wishes to proceed with the planned anesthetic.
